# Patient Record
Sex: FEMALE | Race: WHITE | HISPANIC OR LATINO | Employment: FULL TIME | URBAN - METROPOLITAN AREA
[De-identification: names, ages, dates, MRNs, and addresses within clinical notes are randomized per-mention and may not be internally consistent; named-entity substitution may affect disease eponyms.]

---

## 2024-05-12 ENCOUNTER — APPOINTMENT (OUTPATIENT)
Dept: RADIOLOGY | Facility: MEDICAL CENTER | Age: 29
End: 2024-05-12
Attending: EMERGENCY MEDICINE

## 2024-05-12 ENCOUNTER — PHARMACY VISIT (OUTPATIENT)
Dept: PHARMACY | Facility: MEDICAL CENTER | Age: 29
End: 2024-05-12
Payer: COMMERCIAL

## 2024-05-12 ENCOUNTER — HOSPITAL ENCOUNTER (EMERGENCY)
Facility: MEDICAL CENTER | Age: 29
End: 2024-05-12
Attending: EMERGENCY MEDICINE

## 2024-05-12 VITALS
TEMPERATURE: 97 F | RESPIRATION RATE: 17 BRPM | HEART RATE: 56 BPM | SYSTOLIC BLOOD PRESSURE: 119 MMHG | DIASTOLIC BLOOD PRESSURE: 75 MMHG | HEIGHT: 65 IN | OXYGEN SATURATION: 98 % | BODY MASS INDEX: 27.25 KG/M2 | WEIGHT: 163.58 LBS

## 2024-05-12 DIAGNOSIS — L03.031 ABSCESS OR CELLULITIS OF TOE, RIGHT: ICD-10-CM

## 2024-05-12 DIAGNOSIS — L02.611 ABSCESS OR CELLULITIS OF TOE, RIGHT: ICD-10-CM

## 2024-05-12 LAB
GRAM STN SPEC: NORMAL
SIGNIFICANT IND 70042: NORMAL
SITE SITE: NORMAL
SOURCE SOURCE: NORMAL

## 2024-05-12 PROCEDURE — RXMED WILLOW AMBULATORY MEDICATION CHARGE: Performed by: EMERGENCY MEDICINE

## 2024-05-12 RX ORDER — AMOXICILLIN AND CLAVULANATE POTASSIUM 875; 125 MG/1; MG/1
1 TABLET, FILM COATED ORAL 2 TIMES DAILY
Qty: 14 TABLET | Refills: 0 | Status: ACTIVE | OUTPATIENT
Start: 2024-05-12 | End: 2024-05-19

## 2024-05-12 RX ORDER — SULFAMETHOXAZOLE AND TRIMETHOPRIM 800; 160 MG/1; MG/1
1 TABLET ORAL 2 TIMES DAILY
Qty: 14 TABLET | Refills: 0 | Status: ACTIVE | OUTPATIENT
Start: 2024-05-12 | End: 2024-05-19 | Stop reason: SDUPTHER

## 2024-05-12 NOTE — ED PROVIDER NOTES
"CHIEF COMPLAINT  Chief Complaint   Patient presents with    Leg Pain     RLE, started as R big toe pain 5 days ago and now radiates up RLE, small wound inside R big toe starting today, denies trauma, mild inflammation noted to toe       LIMITATION TO HISTORY   Language  used    HPI    Katelynn Montanez is a 28 y.o. female who presents to the Emergency Department for right great toe pain onset 5 days. She adds that she noticed a small wound at the toe that \"bursted\" this morning. She also reports experiencing right great toe swelling and itchiness. She denies fever or vomiting. No alleviating or exacerbating factors noted. Patient has no known allergies. She denies any past medical history.    OUTSIDE HISTORIAN(S):  Select: None    EXTERNAL RECORDS REVIEWED  Select: No records within EMR    PAST MEDICAL HISTORY  History reviewed. No pertinent past medical history.  .    SURGICAL HISTORY  No past surgical history noted.      FAMILY HISTORY  No family history noted.      SOCIAL HISTORY         CURRENT MEDICATIONS  Current Outpatient Medications   Medication Instructions    amoxicillin-clavulanate (AUGMENTIN) 875-125 MG Tab 1 Tablet, Oral, 2 TIMES DAILY    sulfamethoxazole-trimethoprim (BACTRIM DS) 800-160 MG tablet 1 Tablet, Oral, 2 TIMES DAILY     ALLERGIES  No Known Allergies    PHYSICAL EXAM  VITAL SIGNS:/80   Pulse 62   Temp 36.1 °C (96.9 °F) (Temporal)   Resp 16   Ht 1.651 m (5' 5\")   Wt 74.2 kg (163 lb 9.3 oz)   SpO2 99%   BMI 27.22 kg/m²       Constitutional: Well-developed no acute distress   HENT: Normocephalic, Atraumatic, Bilateral external ears normal.  Eyes:  conjunctiva are normal.   Neck: Supple.  Nontender midline  Cardiovascular: Regular rate and rhythm without murmurs gallops or rubs.   Thorax & Lungs: No respiratory distress. Breathing comfortably. Lungs are clear to auscultation bilaterally, there are no wheezes no rales. Chest wall is nontender.  Abdomen: Soft, " non distended, non tender   Skin: Warm, Dry,   Back: No tenderness, No CVA tenderness.  Musculoskeletal: No clubbing cyanosis or edema good range of motion, draining abscess at the lateral base of the right great toe with surrounding erythema  Neurologic: Alert & oriented x 3, normal sensation moving all extremities appears normal   Psychiatric: Affect normal, Judgment normal, Mood normal.     DIAGNOSTIC STUDIES    RADIOLOGY  I have independently interpreted the diagnostic imaging associated with this visit and am waiting the final reading from the radiologist.   My preliminary interpretation is as follows: No signs of osteomyelitis or abnormalities to the bone.    Radiologist interpretation:   DX-FOOT-2- RIGHT   Final Result      No radiographic evidence for osteomyelitis and there is no soft tissue gas        COURSE & MEDICAL DECISION MAKING    ED COURSE:    ED Observation Status? No, The patient does not qualify for observation status     1:09 PM - Patient seen and examined at bedside. I discussed that the patient's abscess likely appeared due to the patient using shoes that are too tight for her toes. I also discussed care at home for the abscess including salt water washes to clean the area and prevent further infection and starting an antibiotic course. She will also need to keep the toes . Ordered DX-foot right to evaluate. Patient verbalizes understanding and agreement to this plan of care.     2:13 PM - Patient was reevaluated at bedside. Discussed radiology results with the patient and informed them there were no signs of osteomyelitis. Patient had the opportunity to ask any questions. The plan for discharge was discussed with them and they were told to return for any new or worsening symptoms. She was also informed of the plans for follow up. Patient is understanding and agreeable to the plan for discharge.    INITIAL ASSESSMENT, COURSE AND PLAN  Care Narrative: Patient presents for evaluation.   It does appear the patient has an abrasive wound to the right great toe lateral side.  Most likely it is from wearing closed toed and cramped shoes.  I explained to the patient about wound bandage and draining recommended washing the wound out and soaking.  I will start the patient on antibiotics due to the suppurative nature MRSA cannot be completely ruled out so we will place on Augmentin as well as Bactrim.  Patient is to follow-up with occupational health since I do feel this could very well be related to occupational causes because of her job.  The patient should return as needed.     ADDITIONAL PROBLEM LIST  None    DISPOSITION AND DISCUSSIONS  I have discussed management of the patient with the following physicians/ NIKKI's/ ancillary staff:  None    Escalation of care considered, and ultimately not performed: None    Barriers to care at this time, including but not limited to:  None .     Decision tools and prescription drugs considered including, but not limited to: None.    The patient will return for new or worsening symptoms and is stable at the time of discharge.    The patient is referred to a primary physician for blood pressure management, diabetic screening, and for all other preventative health concerns.    DISPOSITION:  Patient will be discharged home in stable condition.    FOLLOW UP:  [address] [address] [phone]  In 1 week  For re-check, Return if any symptoms worsen      OUTPATIENT MEDICATIONS:  Discharge Medication List as of 5/12/2024  3:07 PM        START taking these medications    Details   amoxicillin-clavulanate (AUGMENTIN) 875-125 MG Tab Take 1 Tablet by mouth 2 times a day for 7 days., Disp-14 Tablet, R-0, Normal      sulfamethoxazole-trimethoprim (BACTRIM DS) 800-160 MG tablet Take 1 Tablet by mouth 2 times a day for 7 days., Disp-14 Tablet, R-0, Normal           FINAL DIAGNOSIS  1. Abscess or cellulitis of toe, right      I, Crow Haro),  am scribing for, and in the presence of, Iván Nicole M.D..    Electronically signed by: Crow Max (Scribe), 5/12/2024    IIván M.D. personally performed the services described in this documentation, as scribed by Crow Max in my presence, and it is both accurate and complete.     Electronically signed by: Iván Nicole M.D.,5:20 PM 05/12/24

## 2024-05-12 NOTE — LETTER
5/16/2024               Katelynn Montanez  [address] [facility] [address]        Dear Katelynn (MR#[id number])    This letter is sent in regards to your recent visit to the [facility] Emergency Department on 5/12/2024. During the visit, tests were performed to assist the physician in your medical diagnosis. A review of your tests requires that we notify you of the following:    Your wound culture was POSITIVE for a bacteria called Methicillin Resistant Staphylococcus aureus (MRSA). The antibiotic prescribed for you (sulfamethoxazole-trimethoprim and amoxicillin-clavulanate) should be active to treat this bacteria. It is important that you continue taking your antibiotic until it is finished.     Please feel free to contact me at the number below if you have any questions or concerns. Thank you for your cooperation in the matter.    Sincerely,  ED Culture Follow-Up Staff  Dwight Bush, PharmD    [facility], Emergency Department  [address] [place] [id number] [phone] (ED Culture Line)

## 2024-05-12 NOTE — ED NOTES
Pt foot soaked with water and hydrogen peroxide with much success.   Gauze applied between the toes to separate them. Pt resting in gurney with call light within reach.

## 2024-05-12 NOTE — ED NOTES
Pt provided discharge instructions, and prescription w/ the assistance of  ipad. Pt verbalized understanding of all instructions. Pt ambulatory to lobby w/ steady gait.

## 2024-05-12 NOTE — ED TRIAGE NOTES
"Chief Complaint   Patient presents with    Leg Pain     RLE, started as R big toe pain 5 days ago and now radiates up RLE, small wound inside R big toe starting today, denies trauma, mild inflammation noted to toe     Pt ambulatory to triage for above complaints, VSS on RA, GCS 15, NAD.     Pt returned to lobby. Educated on triage process and to inform staff of any changes.     /80   Pulse 62   Temp 36.1 °C (96.9 °F) (Temporal)   Resp 16   Ht 1.651 m (5' 5\")   Wt 74.2 kg (163 lb 9.3 oz)   SpO2 99%   BMI 27.22 kg/m²     "

## 2024-05-12 NOTE — ED NOTES
Pt ambulated to Y61 from lobby w/ steady gait. On monitor, call light in reach. Chart up for ERP.

## 2024-05-14 LAB
BACTERIA WND AEROBE CULT: ABNORMAL
BACTERIA WND AEROBE CULT: ABNORMAL
GRAM STN SPEC: ABNORMAL
SIGNIFICANT IND 70042: ABNORMAL
SITE SITE: ABNORMAL
SOURCE SOURCE: ABNORMAL

## 2024-05-16 NOTE — ED NOTES
ED Positive Culture Follow-up/Notification Note:    Date: 5/16/2024   Patient seen in the ED on 5/12/2024 for R great toe pain. Patient reports wound on toe that bursted prior to presentation along with swelling and itchiness. Patient denies any fever or vomiting.    Physical exam notable for a draining abscess at the base of the R great toe with surrounding erythema. In the ED patient was afebrile with stable vitals. Imaging showed no evidence of OM.   1. Abscess or cellulitis of toe, right       Discharge Medication List as of 5/12/2024  3:07 PM        START taking these medications    Details   amoxicillin-clavulanate (AUGMENTIN) 875-125 MG Tab Take 1 Tablet by mouth 2 times a day for 7 days., Disp-14 Tablet, R-0, Normal      sulfamethoxazole-trimethoprim (BACTRIM DS) 800-160 MG tablet Take 1 Tablet by mouth 2 times a day for 7 days., Disp-14 Tablet, R-0, Normal           Allergies: Patient has no known allergies.     Vitals:    05/12/24 1303 05/12/24 1404 05/12/24 1457 05/12/24 1503   BP: 119/70 111/77  119/75   Pulse: (!) 58 (!) 52  (!) 56   Resp: 18 17     Temp:   36.1 °C (97 °F)    TempSrc:   Temporal    SpO2: 98% 98%  98%   Weight:       Height:         Final cultures:   Results       Procedure Component Value Units Date/Time    CULTURE WOUND W/ GRAM STAIN [[id number]]  (Abnormal)  (Susceptibility) Collected: 05/12/24 1455    Order Status: Completed Specimen: Wound from Abscess Updated: 05/14/24 1121     Significant Indicator POS     Source WND     Site L foot     Culture Result -     Gram Stain Result Few WBCs.  Moderate Gram positive cocci.       Culture Result Methicillin Resistant Staphylococcus aureus  Heavy growth      Susceptibility       Methicillin resistant staphylococcus aureus (1)       Antibiotic Interpretation Microscan   Method Status    Ampicillin  [*]  Resistant >8 mcg/mL MARVIN Final    Amoxicillin/CA  [*]  Resistant <=4/2 mcg/mL MARVIN Final    Azithromycin  [*]  Sensitive <=2 mcg/mL MARVIN Final     Ceftriaxone  [*]  Resistant 8 mcg/mL MARVIN Final    Clindamycin Sensitive <=0.25 mcg/mL MARVIN Final    Cephalothin  [*]  Resistant <=8 mcg/mL MARVIN Final    Cefoxitin Screen  [*]  Positive >4 mcg/mL MARVIN Final    Cefazolin Resistant <=8 mcg/mL MARVIN Final    Ciprofloxacin  [*]  Intermediate 2 mcg/mL MARVIN Final    Cefepime Resistant 8 mcg/mL MARVIN Final    Ceftaroline  [*]  Sensitive <=0.5 mcg/mL MARVIN Final    Daptomycin Sensitive 1 mcg/mL MARVIN Final    Ampicillin/sulbactam Resistant <=8/4 mcg/mL MARVIN Final    Erythromycin Sensitive <=0.25 mcg/mL MARVIN Final    Vancomycin Sensitive 1 mcg/mL MARVIN Final    Gentamicin  [*]  Sensitive <=4 mcg/mL MARVIN Final    Imipenem  [*]  Resistant <=4 mcg/mL MARVIN Final    Levofloxacin  [*]  Sensitive <=1 mcg/mL MARVIN Final    Linezolid Sensitive 2 mcg/mL MARVIN Final    Meropenem  [*]  Resistant <=2 mcg/mL MARVIN Final    Oxacillin Resistant >2 mcg/mL MARVIN Final    Rifampin  [*]  Sensitive <=1 mcg/mL MARVIN Final    Synercid  [*]  Sensitive <=0.5 mcg/mL MARVIN Final    Trimeth/Sulfa Sensitive <=0.5/9.5 mcg/mL MARVIN Final    Tetracycline Sensitive <=4 mcg/mL MARVIN Final    Tigecycline  [*]  Sensitive <=0.25 mcg/mL MARVIN Final               [*]  Suppressed Antibiotic                   GRAM STAIN [[id number]] Collected: 05/12/24 1455    Order Status: Completed Specimen: Wound Updated: 05/12/24 2011     Significant Indicator .     Source WND     Site L foot     Gram Stain Result Few WBCs.  Moderate Gram positive cocci.            Plan:   Patient's wound culture returned positive for MRSA sensitive to prescribed TMP-SMX and resistant to prescribed amoxicillin-clavulanate. With wound culture and presentation consistent with MRSA infection amoxicillin-clavulanate can likely be stopped at this time.  Attempted to contact patient using  service to discuss results. Was unable to contact patient at listed number, unable to leave voicemail.  Sent letter to patient to notify of positive culture result and encourage compliance  with prescribed antibiotics.   Dwight Bush, PharmD

## 2024-05-19 ENCOUNTER — HOSPITAL ENCOUNTER (EMERGENCY)
Facility: MEDICAL CENTER | Age: 29
End: 2024-05-19
Attending: EMERGENCY MEDICINE

## 2024-05-19 ENCOUNTER — PHARMACY VISIT (OUTPATIENT)
Dept: PHARMACY | Facility: MEDICAL CENTER | Age: 29
End: 2024-05-19
Payer: COMMERCIAL

## 2024-05-19 VITALS
DIASTOLIC BLOOD PRESSURE: 77 MMHG | OXYGEN SATURATION: 98 % | TEMPERATURE: 98 F | RESPIRATION RATE: 15 BRPM | BODY MASS INDEX: 27.26 KG/M2 | SYSTOLIC BLOOD PRESSURE: 110 MMHG | WEIGHT: 163.8 LBS | HEART RATE: 67 BPM

## 2024-05-19 DIAGNOSIS — L08.9 TOE INFECTION: ICD-10-CM

## 2024-05-19 LAB — GLUCOSE BLD STRIP.AUTO-MCNC: 103 MG/DL (ref 65–99)

## 2024-05-19 PROCEDURE — RXMED WILLOW AMBULATORY MEDICATION CHARGE: Performed by: EMERGENCY MEDICINE

## 2024-05-19 RX ORDER — SULFAMETHOXAZOLE AND TRIMETHOPRIM 800; 160 MG/1; MG/1
1 TABLET ORAL 2 TIMES DAILY
Qty: 10 TABLET | Refills: 0 | Status: ACTIVE | OUTPATIENT
Start: 2024-05-19 | End: 2024-05-24

## 2024-05-19 NOTE — ED NOTES
Patient ambulated to room from lobby with steady gait. Agree with traige assesment.     Chart up for ERP to see.

## 2024-05-19 NOTE — ED PROVIDER NOTES
ED Provider Note    CHIEF COMPLAINT  Chief Complaint   Patient presents with    Wound Re-Check    Other     Work note       EXTERNAL RECORDS REVIEWED  Patient was last seen 5/12/2024 for a cellulitis of the great toe.  Discharged on Augmentin and Bactrim.  X-ray without evidence of osteomyelitis.  Wound culture returned positive for MRSA.    HPI/ROS  LIMITATION TO HISTORY   Select: : None  OUTSIDE HISTORIAN(S):  none    Katelynn Montanez is a 28 y.o. female who presents to the Emergency Department for wound check.  Patient was seen here 1 week ago for a wound to the right great toe.  She was discharged on antibiotics which she states she finished.  She reports that there was some bruising and blistering following her visit here however eventually it is now improved.  She reports resolution of the pain.  She still has a small wound with minimal drainage.  No fevers.  No history of diabetes.    PAST MEDICAL HISTORY  No past medical history on file.     SURGICAL HISTORY  No past surgical history on file.     FAMILY HISTORY  No family history on file.    SOCIAL HISTORY       CURRENT MEDICATIONS  Discharge Medication List as of 5/19/2024  5:29 PM        CONTINUE these medications which have NOT CHANGED    Details   amoxicillin-clavulanate (AUGMENTIN) 875-125 MG Tab Take 1 Tablet by mouth 2 times a day for 7 days., Disp-14 Tablet, R-0, Normal             ALLERGIES  Patient has no known allergies.    PHYSICAL EXAM  /77   Pulse 67   Temp 36.7 °C (98 °F)   Resp 15   Wt 74.3 kg (163 lb 12.8 oz)   SpO2 98%      Constitutional: Nontoxic appearing. Alert in no apparent distress.  HENT: Normocephalic, Atraumatic.  Moist mucous membranes.    Neck: Supple, full range of motion  Musculoskeletal: Atraumatic. No obvious deformities noted.  Small open wound to the lateral base of the right great toe with scant drainage.  Minimal surrounding erythema.  Good range of motion of the toe without pain.  Skin: Warm, Dry.  No  erythema, No rash.   Neurologic: Alert and oriented x3. Moving all extremities spontaneously without focal deficits.  Psychiatric: Affect normal, Mood normal, Appears appropriate and not intoxicated.      DIAGNOSTIC STUDIES / PROCEDURES      COURSE & MEDICAL DECISION MAKING      ASSESSMENT, COURSE AND PLAN  Care Narrative: Patient recently seen for great toe infection presenting for wound recheck.  She reports significant improvement of her symptoms however is still having some ongoing mild drainage.  She is afebrile with normal vital signs on arrival.  Appears does still have some ongoing infection.  I did consider further labs and imaging however improved from her comparison pictures.  Will discharge with a few more days of Bactrim based on wound culture sensitivities.  Patient is requesting clearance to go back to work as well.      ADDITIONAL PROBLEM LIST  Problem #1: Right great toe infection -appears improving although still having some drainage therefore will discharge with another 5 days of Bactrim      DISPOSITION AND DISCUSSIONS  Escalation of care considered, and ultimately not performed:Laboratory analysis and diagnostic imaging    Barriers to care at this time, including but not limited to: Patient does not have established PCP.     Decision tools and prescription drugs considered including, but not limited to: Pain Medications OTC medication to be sufficient .        DISPOSITION:  Patient will be discharged home in stable condition.    FOLLOW UP:  [facility], Emergency Dept  [address] [facility] [address] [phone]    If symptoms worsen    [place] [address] [place] [id number]  [phone]  Call   to establish primary care physician      OUTPATIENT MEDICATIONS:  Discharge Medication List as of 5/19/2024  5:29 PM        START taking these medications    Details   sulfamethoxazole-trimethoprim (BACTRIM DS) 800-160 MG tablet Take 1 Tablet by mouth 2  times a day for 5 days., Disp-10 Tablet, R-0, Normal               FINAL DIAGNOSIS  1. Toe infection

## 2024-05-19 NOTE — ED TRIAGE NOTES
Ambulatory to triage, triaged using the  iPad.  Patient was seen here on 5/12 for cellulitis of R great toe, taking antibiotics as prescribed.  Patient states she wasn't able to return to work at all last week so is here today for a recheck and note to be able to return to work.

## 2024-05-20 NOTE — DISCHARGE INSTRUCTIONS
You were seen in the Emergency Department for wound check.    Please use 1,000mg of tylenol or 600mg of ibuprofen every 6 hours as needed for pain.  Continue an extra 5 days of antibiotics.    If this is a Workmen's Comp. claim, he will need to follow-up at the occupational health clinic above.    Return to the Emergency Department with increasing pain or swelling, fevers, or other concerns.

## 2024-08-13 ENCOUNTER — HOSPITAL ENCOUNTER (OUTPATIENT)
Dept: RADIOLOGY | Facility: MEDICAL CENTER | Age: 29
End: 2024-08-13

## 2024-08-13 ENCOUNTER — HOSPITAL ENCOUNTER (INPATIENT)
Facility: MEDICAL CENTER | Age: 29
LOS: 5 days | DRG: 144 | End: 2024-08-18
Attending: STUDENT IN AN ORGANIZED HEALTH CARE EDUCATION/TRAINING PROGRAM | Admitting: INTERNAL MEDICINE
Payer: MEDICAID

## 2024-08-13 DIAGNOSIS — G08 CAVERNOUS SINUS THROMBOSIS: ICD-10-CM

## 2024-08-13 DIAGNOSIS — J01.40 ACUTE PANSINUSITIS, RECURRENCE NOT SPECIFIED: ICD-10-CM

## 2024-08-13 DIAGNOSIS — L03.213 PERIORBITAL CELLULITIS OF RIGHT EYE: ICD-10-CM

## 2024-08-13 DIAGNOSIS — J01.00 ACUTE NON-RECURRENT MAXILLARY SINUSITIS: ICD-10-CM

## 2024-08-13 LAB
ALBUMIN SERPL BCP-MCNC: 3.9 G/DL (ref 3.2–4.9)
ALBUMIN/GLOB SERPL: 1.3 G/DL
ALP SERPL-CCNC: 71 U/L (ref 30–99)
ALT SERPL-CCNC: 14 U/L (ref 2–50)
ANION GAP SERPL CALC-SCNC: 12 MMOL/L (ref 7–16)
AST SERPL-CCNC: 13 U/L (ref 12–45)
BASOPHILS # BLD AUTO: 0.5 % (ref 0–1.8)
BASOPHILS # BLD: 0.04 K/UL (ref 0–0.12)
BILIRUB SERPL-MCNC: <0.2 MG/DL (ref 0.1–1.5)
BUN SERPL-MCNC: 10 MG/DL (ref 8–22)
CALCIUM ALBUM COR SERPL-MCNC: 8.9 MG/DL (ref 8.5–10.5)
CALCIUM SERPL-MCNC: 8.8 MG/DL (ref 8.5–10.5)
CHLORIDE SERPL-SCNC: 104 MMOL/L (ref 96–112)
CO2 SERPL-SCNC: 20 MMOL/L (ref 20–33)
CREAT SERPL-MCNC: 0.52 MG/DL (ref 0.5–1.4)
EOSINOPHIL # BLD AUTO: 0.36 K/UL (ref 0–0.51)
EOSINOPHIL NFR BLD: 4.7 % (ref 0–6.9)
ERYTHROCYTE [DISTWIDTH] IN BLOOD BY AUTOMATED COUNT: 41.4 FL (ref 35.9–50)
GFR SERPLBLD CREATININE-BSD FMLA CKD-EPI: 129 ML/MIN/1.73 M 2
GLOBULIN SER CALC-MCNC: 3 G/DL (ref 1.9–3.5)
GLUCOSE SERPL-MCNC: 108 MG/DL (ref 65–99)
HCT VFR BLD AUTO: 38.1 % (ref 37–47)
HGB BLD-MCNC: 12.6 G/DL (ref 12–16)
IMM GRANULOCYTES # BLD AUTO: 0.01 K/UL (ref 0–0.11)
IMM GRANULOCYTES NFR BLD AUTO: 0.1 % (ref 0–0.9)
LYMPHOCYTES # BLD AUTO: 3.19 K/UL (ref 1–4.8)
LYMPHOCYTES NFR BLD: 41.6 % (ref 22–41)
MCH RBC QN AUTO: 29.2 PG (ref 27–33)
MCHC RBC AUTO-ENTMCNC: 33.1 G/DL (ref 32.2–35.5)
MCV RBC AUTO: 88.2 FL (ref 81.4–97.8)
MONOCYTES # BLD AUTO: 0.68 K/UL (ref 0–0.85)
MONOCYTES NFR BLD AUTO: 8.9 % (ref 0–13.4)
NEUTROPHILS # BLD AUTO: 3.39 K/UL (ref 1.82–7.42)
NEUTROPHILS NFR BLD: 44.2 % (ref 44–72)
NRBC # BLD AUTO: 0 K/UL
NRBC BLD-RTO: 0 /100 WBC (ref 0–0.2)
PLATELET # BLD AUTO: 358 K/UL (ref 164–446)
PMV BLD AUTO: 8.8 FL (ref 9–12.9)
POTASSIUM SERPL-SCNC: 4.1 MMOL/L (ref 3.6–5.5)
PROT SERPL-MCNC: 6.9 G/DL (ref 6–8.2)
RBC # BLD AUTO: 4.32 M/UL (ref 4.2–5.4)
SODIUM SERPL-SCNC: 136 MMOL/L (ref 135–145)
WBC # BLD AUTO: 7.7 K/UL (ref 4.8–10.8)

## 2024-08-13 PROCEDURE — 99291 CRITICAL CARE FIRST HOUR: CPT | Mod: GC | Performed by: INTERNAL MEDICINE

## 2024-08-13 PROCEDURE — 85025 COMPLETE CBC W/AUTO DIFF WBC: CPT

## 2024-08-13 PROCEDURE — 80053 COMPREHEN METABOLIC PANEL: CPT

## 2024-08-13 PROCEDURE — 96365 THER/PROPH/DIAG IV INF INIT: CPT

## 2024-08-13 PROCEDURE — 770022 HCHG ROOM/CARE - ICU (200)

## 2024-08-13 PROCEDURE — 99291 CRITICAL CARE FIRST HOUR: CPT

## 2024-08-13 PROCEDURE — 36415 COLL VENOUS BLD VENIPUNCTURE: CPT

## 2024-08-13 PROCEDURE — 84703 CHORIONIC GONADOTROPIN ASSAY: CPT

## 2024-08-13 PROCEDURE — 700111 HCHG RX REV CODE 636 W/ 250 OVERRIDE (IP): Performed by: STUDENT IN AN ORGANIZED HEALTH CARE EDUCATION/TRAINING PROGRAM

## 2024-08-13 PROCEDURE — 700111 HCHG RX REV CODE 636 W/ 250 OVERRIDE (IP): Mod: UD | Performed by: INTERNAL MEDICINE

## 2024-08-13 RX ORDER — PROMETHAZINE HYDROCHLORIDE 25 MG/1
12.5-25 SUPPOSITORY RECTAL EVERY 4 HOURS PRN
Status: DISCONTINUED | OUTPATIENT
Start: 2024-08-13 | End: 2024-08-18 | Stop reason: HOSPADM

## 2024-08-13 RX ORDER — SODIUM CHLORIDE 9 MG/ML
INJECTION, SOLUTION INTRAVENOUS CONTINUOUS
Status: DISCONTINUED | OUTPATIENT
Start: 2024-08-13 | End: 2024-08-14

## 2024-08-13 RX ORDER — POLYETHYLENE GLYCOL 3350 17 G/17G
1 POWDER, FOR SOLUTION ORAL
Status: DISCONTINUED | OUTPATIENT
Start: 2024-08-13 | End: 2024-08-18 | Stop reason: HOSPADM

## 2024-08-13 RX ORDER — ONDANSETRON 4 MG/1
4 TABLET, ORALLY DISINTEGRATING ORAL EVERY 4 HOURS PRN
Status: DISCONTINUED | OUTPATIENT
Start: 2024-08-13 | End: 2024-08-18 | Stop reason: HOSPADM

## 2024-08-13 RX ORDER — PROMETHAZINE HYDROCHLORIDE 25 MG/1
12.5-25 TABLET ORAL EVERY 4 HOURS PRN
Status: DISCONTINUED | OUTPATIENT
Start: 2024-08-13 | End: 2024-08-18 | Stop reason: HOSPADM

## 2024-08-13 RX ORDER — HEPARIN SODIUM 5000 [USP'U]/100ML
0-30 INJECTION, SOLUTION INTRAVENOUS CONTINUOUS
Status: DISCONTINUED | OUTPATIENT
Start: 2024-08-13 | End: 2024-08-14

## 2024-08-13 RX ORDER — ONDANSETRON 2 MG/ML
4 INJECTION INTRAMUSCULAR; INTRAVENOUS EVERY 4 HOURS PRN
Status: DISCONTINUED | OUTPATIENT
Start: 2024-08-13 | End: 2024-08-18 | Stop reason: HOSPADM

## 2024-08-13 RX ORDER — HYDROMORPHONE HYDROCHLORIDE 1 MG/ML
0.5 INJECTION, SOLUTION INTRAMUSCULAR; INTRAVENOUS; SUBCUTANEOUS
Status: DISCONTINUED | OUTPATIENT
Start: 2024-08-13 | End: 2024-08-18 | Stop reason: HOSPADM

## 2024-08-13 RX ORDER — ACETAMINOPHEN 325 MG/1
650 TABLET ORAL EVERY 6 HOURS PRN
Status: DISCONTINUED | OUTPATIENT
Start: 2024-08-13 | End: 2024-08-18 | Stop reason: HOSPADM

## 2024-08-13 RX ORDER — OXYCODONE HYDROCHLORIDE 5 MG/1
2.5-5 TABLET ORAL EVERY 4 HOURS PRN
Status: DISCONTINUED | OUTPATIENT
Start: 2024-08-13 | End: 2024-08-18 | Stop reason: HOSPADM

## 2024-08-13 RX ORDER — HYDRALAZINE HYDROCHLORIDE 20 MG/ML
10 INJECTION INTRAMUSCULAR; INTRAVENOUS EVERY 4 HOURS PRN
Status: DISCONTINUED | OUTPATIENT
Start: 2024-08-13 | End: 2024-08-18 | Stop reason: HOSPADM

## 2024-08-13 RX ORDER — HEPARIN SODIUM 1000 [USP'U]/ML
40 INJECTION, SOLUTION INTRAVENOUS; SUBCUTANEOUS PRN
Status: DISCONTINUED | OUTPATIENT
Start: 2024-08-14 | End: 2024-08-14

## 2024-08-13 RX ORDER — PROCHLORPERAZINE EDISYLATE 5 MG/ML
5-10 INJECTION INTRAMUSCULAR; INTRAVENOUS EVERY 4 HOURS PRN
Status: DISCONTINUED | OUTPATIENT
Start: 2024-08-13 | End: 2024-08-18 | Stop reason: HOSPADM

## 2024-08-13 RX ORDER — AMOXICILLIN 250 MG
2 CAPSULE ORAL 2 TIMES DAILY
Status: DISCONTINUED | OUTPATIENT
Start: 2024-08-13 | End: 2024-08-18 | Stop reason: HOSPADM

## 2024-08-13 RX ADMIN — HEPARIN SODIUM 18 UNITS/KG/HR: 5000 INJECTION, SOLUTION INTRAVENOUS at 22:48

## 2024-08-13 ASSESSMENT — PAIN DESCRIPTION - PAIN TYPE: TYPE: ACUTE PAIN

## 2024-08-14 ENCOUNTER — ANESTHESIA EVENT (OUTPATIENT)
Dept: SURGERY | Facility: MEDICAL CENTER | Age: 29
DRG: 144 | End: 2024-08-14
Payer: MEDICAID

## 2024-08-14 ENCOUNTER — APPOINTMENT (OUTPATIENT)
Dept: RADIOLOGY | Facility: MEDICAL CENTER | Age: 29
DRG: 144 | End: 2024-08-14
Attending: STUDENT IN AN ORGANIZED HEALTH CARE EDUCATION/TRAINING PROGRAM
Payer: MEDICAID

## 2024-08-14 ENCOUNTER — ANESTHESIA (OUTPATIENT)
Dept: SURGERY | Facility: MEDICAL CENTER | Age: 29
DRG: 144 | End: 2024-08-14
Payer: MEDICAID

## 2024-08-14 ENCOUNTER — APPOINTMENT (OUTPATIENT)
Dept: RADIOLOGY | Facility: MEDICAL CENTER | Age: 29
DRG: 144 | End: 2024-08-14
Attending: OTOLARYNGOLOGY
Payer: MEDICAID

## 2024-08-14 PROBLEM — L03.213 PERIORBITAL CELLULITIS OF RIGHT EYE: Status: ACTIVE | Noted: 2024-08-14

## 2024-08-14 PROBLEM — J01.90 ACUTE SINUSITIS: Status: ACTIVE | Noted: 2024-08-14

## 2024-08-14 LAB
ALBUMIN SERPL BCP-MCNC: 3.5 G/DL (ref 3.2–4.9)
ALBUMIN/GLOB SERPL: 1.3 G/DL
ALP SERPL-CCNC: 62 U/L (ref 30–99)
ALT SERPL-CCNC: 10 U/L (ref 2–50)
ANION GAP SERPL CALC-SCNC: 12 MMOL/L (ref 7–16)
AST SERPL-CCNC: 10 U/L (ref 12–45)
BASOPHILS # BLD AUTO: 0.6 % (ref 0–1.8)
BASOPHILS # BLD: 0.04 K/UL (ref 0–0.12)
BILIRUB SERPL-MCNC: <0.2 MG/DL (ref 0.1–1.5)
BUN SERPL-MCNC: 8 MG/DL (ref 8–22)
CALCIUM ALBUM COR SERPL-MCNC: 8.7 MG/DL (ref 8.5–10.5)
CALCIUM SERPL-MCNC: 8.3 MG/DL (ref 8.5–10.5)
CHLORIDE SERPL-SCNC: 105 MMOL/L (ref 96–112)
CO2 SERPL-SCNC: 20 MMOL/L (ref 20–33)
CREAT SERPL-MCNC: 0.47 MG/DL (ref 0.5–1.4)
EOSINOPHIL # BLD AUTO: 0.31 K/UL (ref 0–0.51)
EOSINOPHIL NFR BLD: 4.8 % (ref 0–6.9)
ERYTHROCYTE [DISTWIDTH] IN BLOOD BY AUTOMATED COUNT: 41.5 FL (ref 35.9–50)
GFR SERPLBLD CREATININE-BSD FMLA CKD-EPI: 132 ML/MIN/1.73 M 2
GLOBULIN SER CALC-MCNC: 2.8 G/DL (ref 1.9–3.5)
GLUCOSE SERPL-MCNC: 93 MG/DL (ref 65–99)
HCG SERPL QL: NEGATIVE
HCT VFR BLD AUTO: 36.4 % (ref 37–47)
HGB BLD-MCNC: 11.9 G/DL (ref 12–16)
IMM GRANULOCYTES # BLD AUTO: 0.02 K/UL (ref 0–0.11)
IMM GRANULOCYTES NFR BLD AUTO: 0.3 % (ref 0–0.9)
INR PPP: 1.13 (ref 0.87–1.13)
LYMPHOCYTES # BLD AUTO: 2.84 K/UL (ref 1–4.8)
LYMPHOCYTES NFR BLD: 44.3 % (ref 22–41)
MAGNESIUM SERPL-MCNC: 1.8 MG/DL (ref 1.5–2.5)
MCH RBC QN AUTO: 29 PG (ref 27–33)
MCHC RBC AUTO-ENTMCNC: 32.7 G/DL (ref 32.2–35.5)
MCV RBC AUTO: 88.6 FL (ref 81.4–97.8)
MONOCYTES # BLD AUTO: 0.56 K/UL (ref 0–0.85)
MONOCYTES NFR BLD AUTO: 8.7 % (ref 0–13.4)
NEUTROPHILS # BLD AUTO: 2.64 K/UL (ref 1.82–7.42)
NEUTROPHILS NFR BLD: 41.3 % (ref 44–72)
NRBC # BLD AUTO: 0 K/UL
NRBC BLD-RTO: 0 /100 WBC (ref 0–0.2)
PHOSPHATE SERPL-MCNC: 3.3 MG/DL (ref 2.5–4.5)
PLATELET # BLD AUTO: 317 K/UL (ref 164–446)
PMV BLD AUTO: 8.7 FL (ref 9–12.9)
POTASSIUM SERPL-SCNC: 3.8 MMOL/L (ref 3.6–5.5)
PROT SERPL-MCNC: 6.3 G/DL (ref 6–8.2)
PROTHROMBIN TIME: 14.7 SEC (ref 12–14.6)
RBC # BLD AUTO: 4.11 M/UL (ref 4.2–5.4)
SCCMEC + MECA PNL NOSE NAA+PROBE: NEGATIVE
SODIUM SERPL-SCNC: 137 MMOL/L (ref 135–145)
UFH PPP CHRO-ACNC: 0.37 IU/ML
UFH PPP CHRO-ACNC: 0.74 IU/ML
WBC # BLD AUTO: 6.4 K/UL (ref 4.8–10.8)

## 2024-08-14 PROCEDURE — 87076 CULTURE ANAEROBE IDENT EACH: CPT | Mod: 91

## 2024-08-14 PROCEDURE — 700101 HCHG RX REV CODE 250: Performed by: OTOLARYNGOLOGY

## 2024-08-14 PROCEDURE — 87641 MR-STAPH DNA AMP PROBE: CPT

## 2024-08-14 PROCEDURE — 80053 COMPREHEN METABOLIC PANEL: CPT

## 2024-08-14 PROCEDURE — 87186 SC STD MICRODIL/AGAR DIL: CPT

## 2024-08-14 PROCEDURE — 160009 HCHG ANES TIME/MIN: Performed by: OTOLARYNGOLOGY

## 2024-08-14 PROCEDURE — A9270 NON-COVERED ITEM OR SERVICE: HCPCS | Performed by: INTERNAL MEDICINE

## 2024-08-14 PROCEDURE — 09BS8ZZ EXCISION OF RIGHT FRONTAL SINUS, VIA NATURAL OR ARTIFICIAL OPENING ENDOSCOPIC: ICD-10-PCS | Performed by: OTOLARYNGOLOGY

## 2024-08-14 PROCEDURE — 099R8ZZ DRAINAGE OF LEFT MAXILLARY SINUS, VIA NATURAL OR ARTIFICIAL OPENING ENDOSCOPIC: ICD-10-PCS | Performed by: OTOLARYNGOLOGY

## 2024-08-14 PROCEDURE — 87075 CULTR BACTERIA EXCEPT BLOOD: CPT

## 2024-08-14 PROCEDURE — 99291 CRITICAL CARE FIRST HOUR: CPT | Performed by: STUDENT IN AN ORGANIZED HEALTH CARE EDUCATION/TRAINING PROGRAM

## 2024-08-14 PROCEDURE — 8E09XBZ COMPUTER ASSISTED PROCEDURE OF HEAD AND NECK REGION: ICD-10-PCS | Performed by: OTOLARYNGOLOGY

## 2024-08-14 PROCEDURE — 700101 HCHG RX REV CODE 250: Performed by: ANESTHESIOLOGY

## 2024-08-14 PROCEDURE — 700111 HCHG RX REV CODE 636 W/ 250 OVERRIDE (IP): Performed by: INTERNAL MEDICINE

## 2024-08-14 PROCEDURE — 700102 HCHG RX REV CODE 250 W/ 637 OVERRIDE(OP): Performed by: ANESTHESIOLOGY

## 2024-08-14 PROCEDURE — 84100 ASSAY OF PHOSPHORUS: CPT

## 2024-08-14 PROCEDURE — 099Q8ZZ DRAINAGE OF RIGHT MAXILLARY SINUS, VIA NATURAL OR ARTIFICIAL OPENING ENDOSCOPIC: ICD-10-PCS | Performed by: OTOLARYNGOLOGY

## 2024-08-14 PROCEDURE — 85025 COMPLETE CBC W/AUTO DIFF WBC: CPT

## 2024-08-14 PROCEDURE — 83735 ASSAY OF MAGNESIUM: CPT

## 2024-08-14 PROCEDURE — 700105 HCHG RX REV CODE 258: Performed by: ANESTHESIOLOGY

## 2024-08-14 PROCEDURE — 85520 HEPARIN ASSAY: CPT

## 2024-08-14 PROCEDURE — 160048 HCHG OR STATISTICAL LEVEL 1-5: Performed by: OTOLARYNGOLOGY

## 2024-08-14 PROCEDURE — A9579 GAD-BASE MR CONTRAST NOS,1ML: HCPCS | Mod: JZ | Performed by: STUDENT IN AN ORGANIZED HEALTH CARE EDUCATION/TRAINING PROGRAM

## 2024-08-14 PROCEDURE — 70450 CT HEAD/BRAIN W/O DYE: CPT

## 2024-08-14 PROCEDURE — 87077 CULTURE AEROBIC IDENTIFY: CPT | Mod: 91

## 2024-08-14 PROCEDURE — 700102 HCHG RX REV CODE 250 W/ 637 OVERRIDE(OP): Performed by: OTOLARYNGOLOGY

## 2024-08-14 PROCEDURE — 87205 SMEAR GRAM STAIN: CPT

## 2024-08-14 PROCEDURE — 09BT8ZZ EXCISION OF LEFT FRONTAL SINUS, VIA NATURAL OR ARTIFICIAL OPENING ENDOSCOPIC: ICD-10-PCS | Performed by: OTOLARYNGOLOGY

## 2024-08-14 PROCEDURE — 09BL8ZZ EXCISION OF NASAL TURBINATE, VIA NATURAL OR ARTIFICIAL OPENING ENDOSCOPIC: ICD-10-PCS | Performed by: OTOLARYNGOLOGY

## 2024-08-14 PROCEDURE — 700111 HCHG RX REV CODE 636 W/ 250 OVERRIDE (IP): Mod: JZ | Performed by: ANESTHESIOLOGY

## 2024-08-14 PROCEDURE — 87070 CULTURE OTHR SPECIMN AEROBIC: CPT

## 2024-08-14 PROCEDURE — 700101 HCHG RX REV CODE 250: Performed by: INTERNAL MEDICINE

## 2024-08-14 PROCEDURE — 70543 MRI ORBT/FAC/NCK W/O &W/DYE: CPT

## 2024-08-14 PROCEDURE — 70553 MRI BRAIN STEM W/O & W/DYE: CPT

## 2024-08-14 PROCEDURE — 99222 1ST HOSP IP/OBS MODERATE 55: CPT | Performed by: PSYCHIATRY & NEUROLOGY

## 2024-08-14 PROCEDURE — 700102 HCHG RX REV CODE 250 W/ 637 OVERRIDE(OP): Performed by: INTERNAL MEDICINE

## 2024-08-14 PROCEDURE — 160029 HCHG SURGERY MINUTES - 1ST 30 MINS LEVEL 4: Performed by: OTOLARYNGOLOGY

## 2024-08-14 PROCEDURE — 700111 HCHG RX REV CODE 636 W/ 250 OVERRIDE (IP): Performed by: ANESTHESIOLOGY

## 2024-08-14 PROCEDURE — 700117 HCHG RX CONTRAST REV CODE 255: Mod: JZ | Performed by: STUDENT IN AN ORGANIZED HEALTH CARE EDUCATION/TRAINING PROGRAM

## 2024-08-14 PROCEDURE — A9270 NON-COVERED ITEM OR SERVICE: HCPCS | Performed by: ANESTHESIOLOGY

## 2024-08-14 PROCEDURE — 88305 TISSUE EXAM BY PATHOLOGIST: CPT

## 2024-08-14 PROCEDURE — 85610 PROTHROMBIN TIME: CPT

## 2024-08-14 PROCEDURE — 700105 HCHG RX REV CODE 258: Performed by: INTERNAL MEDICINE

## 2024-08-14 PROCEDURE — 87040 BLOOD CULTURE FOR BACTERIA: CPT

## 2024-08-14 PROCEDURE — 160002 HCHG RECOVERY MINUTES (STAT): Performed by: OTOLARYNGOLOGY

## 2024-08-14 PROCEDURE — 88311 DECALCIFY TISSUE: CPT

## 2024-08-14 PROCEDURE — 770022 HCHG ROOM/CARE - ICU (200)

## 2024-08-14 PROCEDURE — 160041 HCHG SURGERY MINUTES - EA ADDL 1 MIN LEVEL 4: Performed by: OTOLARYNGOLOGY

## 2024-08-14 PROCEDURE — A9270 NON-COVERED ITEM OR SERVICE: HCPCS | Performed by: OTOLARYNGOLOGY

## 2024-08-14 PROCEDURE — 160035 HCHG PACU - 1ST 60 MINS PHASE I: Performed by: OTOLARYNGOLOGY

## 2024-08-14 PROCEDURE — 87102 FUNGUS ISOLATION CULTURE: CPT

## 2024-08-14 RX ORDER — HALOPERIDOL 5 MG/ML
1 INJECTION INTRAMUSCULAR
Status: DISCONTINUED | OUTPATIENT
Start: 2024-08-14 | End: 2024-08-14 | Stop reason: HOSPADM

## 2024-08-14 RX ORDER — OXYMETAZOLINE HYDROCHLORIDE 0.05 G/100ML
2 SPRAY NASAL
Status: COMPLETED | OUTPATIENT
Start: 2024-08-14 | End: 2024-08-15

## 2024-08-14 RX ORDER — TOBRAMYCIN AND DEXAMETHASONE 3; 1 MG/ML; MG/ML
1 SUSPENSION/ DROPS OPHTHALMIC 4 TIMES DAILY
Status: DISCONTINUED | OUTPATIENT
Start: 2024-08-14 | End: 2024-08-18 | Stop reason: HOSPADM

## 2024-08-14 RX ORDER — SODIUM CHLORIDE, SODIUM LACTATE, POTASSIUM CHLORIDE, CALCIUM CHLORIDE 600; 310; 30; 20 MG/100ML; MG/100ML; MG/100ML; MG/100ML
INJECTION, SOLUTION INTRAVENOUS CONTINUOUS
Status: DISCONTINUED | OUTPATIENT
Start: 2024-08-14 | End: 2024-08-14 | Stop reason: HOSPADM

## 2024-08-14 RX ORDER — CEFTRIAXONE 1 G/1
INJECTION, POWDER, FOR SOLUTION INTRAMUSCULAR; INTRAVENOUS PRN
Status: DISCONTINUED | OUTPATIENT
Start: 2024-08-14 | End: 2024-08-14 | Stop reason: SURG

## 2024-08-14 RX ORDER — ONDANSETRON 2 MG/ML
INJECTION INTRAMUSCULAR; INTRAVENOUS PRN
Status: DISCONTINUED | OUTPATIENT
Start: 2024-08-14 | End: 2024-08-14 | Stop reason: SURG

## 2024-08-14 RX ORDER — DIPHENHYDRAMINE HYDROCHLORIDE 50 MG/ML
12.5 INJECTION INTRAMUSCULAR; INTRAVENOUS
Status: DISCONTINUED | OUTPATIENT
Start: 2024-08-14 | End: 2024-08-14 | Stop reason: HOSPADM

## 2024-08-14 RX ORDER — HYDROMORPHONE HYDROCHLORIDE 1 MG/ML
0.2 INJECTION, SOLUTION INTRAMUSCULAR; INTRAVENOUS; SUBCUTANEOUS
Status: DISCONTINUED | OUTPATIENT
Start: 2024-08-14 | End: 2024-08-14 | Stop reason: HOSPADM

## 2024-08-14 RX ORDER — ECHINACEA PURPUREA EXTRACT 125 MG
2 TABLET ORAL 4 TIMES DAILY
Status: DISCONTINUED | OUTPATIENT
Start: 2024-08-15 | End: 2024-08-18 | Stop reason: HOSPADM

## 2024-08-14 RX ORDER — HYDROMORPHONE HYDROCHLORIDE 1 MG/ML
0.4 INJECTION, SOLUTION INTRAMUSCULAR; INTRAVENOUS; SUBCUTANEOUS
Status: DISCONTINUED | OUTPATIENT
Start: 2024-08-14 | End: 2024-08-14 | Stop reason: HOSPADM

## 2024-08-14 RX ORDER — METRONIDAZOLE 500 MG/100ML
500 INJECTION, SOLUTION INTRAVENOUS EVERY 12 HOURS
Status: DISCONTINUED | OUTPATIENT
Start: 2024-08-14 | End: 2024-08-15

## 2024-08-14 RX ORDER — ONDANSETRON 2 MG/ML
4 INJECTION INTRAMUSCULAR; INTRAVENOUS
Status: DISCONTINUED | OUTPATIENT
Start: 2024-08-14 | End: 2024-08-14 | Stop reason: HOSPADM

## 2024-08-14 RX ORDER — MEPERIDINE HYDROCHLORIDE 25 MG/ML
12.5 INJECTION INTRAMUSCULAR; INTRAVENOUS; SUBCUTANEOUS
Status: DISCONTINUED | OUTPATIENT
Start: 2024-08-14 | End: 2024-08-14 | Stop reason: HOSPADM

## 2024-08-14 RX ORDER — ROCURONIUM BROMIDE 10 MG/ML
INJECTION, SOLUTION INTRAVENOUS PRN
Status: DISCONTINUED | OUTPATIENT
Start: 2024-08-14 | End: 2024-08-14 | Stop reason: SURG

## 2024-08-14 RX ORDER — OXYCODONE HCL 5 MG/5 ML
5 SOLUTION, ORAL ORAL
Status: COMPLETED | OUTPATIENT
Start: 2024-08-14 | End: 2024-08-14

## 2024-08-14 RX ORDER — HYDROMORPHONE HYDROCHLORIDE 1 MG/ML
0.1 INJECTION, SOLUTION INTRAMUSCULAR; INTRAVENOUS; SUBCUTANEOUS
Status: DISCONTINUED | OUTPATIENT
Start: 2024-08-14 | End: 2024-08-14 | Stop reason: HOSPADM

## 2024-08-14 RX ORDER — LABETALOL HYDROCHLORIDE 5 MG/ML
5 INJECTION, SOLUTION INTRAVENOUS
Status: DISCONTINUED | OUTPATIENT
Start: 2024-08-14 | End: 2024-08-14 | Stop reason: HOSPADM

## 2024-08-14 RX ORDER — SODIUM CHLORIDE, SODIUM LACTATE, POTASSIUM CHLORIDE, CALCIUM CHLORIDE 600; 310; 30; 20 MG/100ML; MG/100ML; MG/100ML; MG/100ML
INJECTION, SOLUTION INTRAVENOUS
Status: DISCONTINUED | OUTPATIENT
Start: 2024-08-14 | End: 2024-08-14 | Stop reason: SURG

## 2024-08-14 RX ORDER — LIDOCAINE HYDROCHLORIDE 20 MG/ML
INJECTION, SOLUTION EPIDURAL; INFILTRATION; INTRACAUDAL; PERINEURAL PRN
Status: DISCONTINUED | OUTPATIENT
Start: 2024-08-14 | End: 2024-08-14 | Stop reason: SURG

## 2024-08-14 RX ORDER — EPHEDRINE SULFATE 50 MG/ML
5 INJECTION, SOLUTION INTRAVENOUS
Status: DISCONTINUED | OUTPATIENT
Start: 2024-08-14 | End: 2024-08-14 | Stop reason: HOSPADM

## 2024-08-14 RX ORDER — LIDOCAINE HYDROCHLORIDE AND EPINEPHRINE 10; 10 MG/ML; UG/ML
INJECTION, SOLUTION INFILTRATION; PERINEURAL
Status: DISCONTINUED | OUTPATIENT
Start: 2024-08-14 | End: 2024-08-14 | Stop reason: HOSPADM

## 2024-08-14 RX ORDER — IPRATROPIUM BROMIDE AND ALBUTEROL SULFATE 2.5; .5 MG/3ML; MG/3ML
3 SOLUTION RESPIRATORY (INHALATION)
Status: DISCONTINUED | OUTPATIENT
Start: 2024-08-14 | End: 2024-08-14 | Stop reason: HOSPADM

## 2024-08-14 RX ORDER — METRONIDAZOLE 500 MG/100ML
INJECTION, SOLUTION INTRAVENOUS PRN
Status: DISCONTINUED | OUTPATIENT
Start: 2024-08-14 | End: 2024-08-14 | Stop reason: SURG

## 2024-08-14 RX ORDER — OXYCODONE HCL 5 MG/5 ML
10 SOLUTION, ORAL ORAL
Status: COMPLETED | OUTPATIENT
Start: 2024-08-14 | End: 2024-08-14

## 2024-08-14 RX ORDER — HALOPERIDOL 5 MG/ML
INJECTION INTRAMUSCULAR PRN
Status: DISCONTINUED | OUTPATIENT
Start: 2024-08-14 | End: 2024-08-14 | Stop reason: SURG

## 2024-08-14 RX ORDER — HYDRALAZINE HYDROCHLORIDE 20 MG/ML
5 INJECTION INTRAMUSCULAR; INTRAVENOUS
Status: DISCONTINUED | OUTPATIENT
Start: 2024-08-14 | End: 2024-08-14 | Stop reason: HOSPADM

## 2024-08-14 RX ORDER — MIDAZOLAM HYDROCHLORIDE 1 MG/ML
1 INJECTION INTRAMUSCULAR; INTRAVENOUS
Status: DISCONTINUED | OUTPATIENT
Start: 2024-08-14 | End: 2024-08-14 | Stop reason: HOSPADM

## 2024-08-14 RX ADMIN — TOBRAMYCIN AND DEXAMETHASONE 1 DROP: 1; 3 SUSPENSION/ DROPS OPHTHALMIC at 23:26

## 2024-08-14 RX ADMIN — OXYCODONE HYDROCHLORIDE 5 MG: 5 TABLET ORAL at 00:30

## 2024-08-14 RX ADMIN — VANCOMYCIN HYDROCHLORIDE 1000 MG: 5 INJECTION, POWDER, LYOPHILIZED, FOR SOLUTION INTRAVENOUS at 05:07

## 2024-08-14 RX ADMIN — OXYMETAZOLINE HCL 2 SPRAY: 0.05 SPRAY NASAL at 23:26

## 2024-08-14 RX ADMIN — ONDANSETRON 4 MG: 2 INJECTION INTRAMUSCULAR; INTRAVENOUS at 17:12

## 2024-08-14 RX ADMIN — FENTANYL CITRATE 50 MCG: 50 INJECTION, SOLUTION INTRAMUSCULAR; INTRAVENOUS at 17:20

## 2024-08-14 RX ADMIN — FENTANYL CITRATE 50 MCG: 50 INJECTION, SOLUTION INTRAMUSCULAR; INTRAVENOUS at 19:29

## 2024-08-14 RX ADMIN — CEFTRIAXONE SODIUM 2 G: 1 INJECTION, POWDER, FOR SOLUTION INTRAMUSCULAR; INTRAVENOUS at 17:18

## 2024-08-14 RX ADMIN — OXYCODONE HYDROCHLORIDE 10 MG: 5 SOLUTION ORAL at 19:29

## 2024-08-14 RX ADMIN — ROCURONIUM BROMIDE 40 MG: 50 INJECTION, SOLUTION INTRAVENOUS at 16:38

## 2024-08-14 RX ADMIN — SUGAMMADEX 200 MG: 100 INJECTION, SOLUTION INTRAVENOUS at 18:52

## 2024-08-14 RX ADMIN — METRONIDAZOLE 500 MG: 5 INJECTION, SOLUTION INTRAVENOUS at 00:30

## 2024-08-14 RX ADMIN — HALOPERIDOL LACTATE 1 MG: 5 INJECTION, SOLUTION INTRAMUSCULAR at 17:10

## 2024-08-14 RX ADMIN — CEFTRIAXONE SODIUM 2000 MG: 10 INJECTION, POWDER, FOR SOLUTION INTRAVENOUS at 20:55

## 2024-08-14 RX ADMIN — VANCOMYCIN HYDROCHLORIDE 1000 MG: 5 INJECTION, POWDER, LYOPHILIZED, FOR SOLUTION INTRAVENOUS at 20:53

## 2024-08-14 RX ADMIN — OXYCODONE HYDROCHLORIDE 5 MG: 5 TABLET ORAL at 06:28

## 2024-08-14 RX ADMIN — TOBRAMYCIN AND DEXAMETHASONE: 3; 1 OINTMENT OPHTHALMIC at 23:26

## 2024-08-14 RX ADMIN — ONDANSETRON 4 MG: 2 INJECTION INTRAMUSCULAR; INTRAVENOUS at 18:42

## 2024-08-14 RX ADMIN — LIDOCAINE HYDROCHLORIDE 100 MG: 20 INJECTION, SOLUTION EPIDURAL; INFILTRATION; INTRACAUDAL at 16:38

## 2024-08-14 RX ADMIN — SODIUM CHLORIDE: 9 INJECTION, SOLUTION INTRAVENOUS at 00:02

## 2024-08-14 RX ADMIN — PROPOFOL 150 MG: 10 INJECTION, EMULSION INTRAVENOUS at 16:38

## 2024-08-14 RX ADMIN — HYDROMORPHONE HYDROCHLORIDE 0.5 MG: 1 INJECTION, SOLUTION INTRAMUSCULAR; INTRAVENOUS; SUBCUTANEOUS at 20:37

## 2024-08-14 RX ADMIN — GADOTERIDOL 13 ML: 279.3 INJECTION, SOLUTION INTRAVENOUS at 14:01

## 2024-08-14 RX ADMIN — HALOPERIDOL LACTATE 0.5 MG: 5 INJECTION, SOLUTION INTRAMUSCULAR at 18:42

## 2024-08-14 RX ADMIN — OXYCODONE HYDROCHLORIDE 5 MG: 5 TABLET ORAL at 11:03

## 2024-08-14 RX ADMIN — PROPOFOL 50 MG: 10 INJECTION, EMULSION INTRAVENOUS at 17:20

## 2024-08-14 RX ADMIN — SODIUM CHLORIDE, POTASSIUM CHLORIDE, SODIUM LACTATE AND CALCIUM CHLORIDE: 600; 310; 30; 20 INJECTION, SOLUTION INTRAVENOUS at 16:34

## 2024-08-14 RX ADMIN — METRONIDAZOLE 500 MG: 5 INJECTION, SOLUTION INTRAVENOUS at 21:00

## 2024-08-14 RX ADMIN — VANCOMYCIN HYDROCHLORIDE 1000 MG: 5 INJECTION, POWDER, LYOPHILIZED, FOR SOLUTION INTRAVENOUS at 12:21

## 2024-08-14 RX ADMIN — FENTANYL CITRATE 50 MCG: 50 INJECTION, SOLUTION INTRAMUSCULAR; INTRAVENOUS at 17:46

## 2024-08-14 RX ADMIN — MINERAL OIL, AND WHITE PETROLATUM 1 APPLICATION: 425; 573 OINTMENT OPHTHALMIC at 16:42

## 2024-08-14 RX ADMIN — CEFTRIAXONE SODIUM 2000 MG: 10 INJECTION, POWDER, FOR SOLUTION INTRAVENOUS at 06:05

## 2024-08-14 RX ADMIN — ROCURONIUM BROMIDE 10 MG: 50 INJECTION, SOLUTION INTRAVENOUS at 17:20

## 2024-08-14 RX ADMIN — METRONIDAZOLE 500 MG: 5 INJECTION, SOLUTION INTRAVENOUS at 17:18

## 2024-08-14 SDOH — ECONOMIC STABILITY: TRANSPORTATION INSECURITY
IN THE PAST 12 MONTHS, HAS THE LACK OF TRANSPORTATION KEPT YOU FROM MEDICAL APPOINTMENTS OR FROM GETTING MEDICATIONS?: NO

## 2024-08-14 SDOH — ECONOMIC STABILITY: TRANSPORTATION INSECURITY
IN THE PAST 12 MONTHS, HAS LACK OF RELIABLE TRANSPORTATION KEPT YOU FROM MEDICAL APPOINTMENTS, MEETINGS, WORK OR FROM GETTING THINGS NEEDED FOR DAILY LIVING?: NO

## 2024-08-14 ASSESSMENT — PAIN DESCRIPTION - PAIN TYPE
TYPE: ACUTE PAIN
TYPE: ACUTE PAIN
TYPE: SURGICAL PAIN
TYPE: ACUTE PAIN
TYPE: SURGICAL PAIN
TYPE: ACUTE PAIN
TYPE: ACUTE PAIN

## 2024-08-14 ASSESSMENT — COGNITIVE AND FUNCTIONAL STATUS - GENERAL
DAILY ACTIVITIY SCORE: 23
SUGGESTED CMS G CODE MODIFIER MOBILITY: CH
MOBILITY SCORE: 24
SUGGESTED CMS G CODE MODIFIER DAILY ACTIVITY: CI
TOILETING: A LITTLE

## 2024-08-14 ASSESSMENT — ENCOUNTER SYMPTOMS
BLURRED VISION: 0
PALPITATIONS: 0
DIARRHEA: 0
MYALGIAS: 0
HEADACHES: 1
EYE PAIN: 1
SORE THROAT: 0
CHILLS: 0
FEVER: 0
SHORTNESS OF BREATH: 0
ABDOMINAL PAIN: 0
SPUTUM PRODUCTION: 0
FOCAL WEAKNESS: 0
CONSTIPATION: 0
MUSCULOSKELETAL NEGATIVE: 1
COUGH: 0
PHOTOPHOBIA: 1
VOMITING: 0
NAUSEA: 0
DOUBLE VISION: 0

## 2024-08-14 ASSESSMENT — LIFESTYLE VARIABLES
EVER HAD A DRINK FIRST THING IN THE MORNING TO STEADY YOUR NERVES TO GET RID OF A HANGOVER: NO
DOES PATIENT WANT TO STOP DRINKING: NO
HAVE YOU EVER FELT YOU SHOULD CUT DOWN ON YOUR DRINKING: NO
ON A TYPICAL DAY WHEN YOU DRINK ALCOHOL HOW MANY DRINKS DO YOU HAVE: 0
TOTAL SCORE: 0
EVER FELT BAD OR GUILTY ABOUT YOUR DRINKING: NO
CONSUMPTION TOTAL: NEGATIVE
HAVE PEOPLE ANNOYED YOU BY CRITICIZING YOUR DRINKING: NO
HOW MANY TIMES IN THE PAST YEAR HAVE YOU HAD 5 OR MORE DRINKS IN A DAY: 0
ALCOHOL_USE: NO
TOTAL SCORE: 0
TOTAL SCORE: 0
AVERAGE NUMBER OF DAYS PER WEEK YOU HAVE A DRINK CONTAINING ALCOHOL: 0

## 2024-08-14 ASSESSMENT — SOCIAL DETERMINANTS OF HEALTH (SDOH)
WITHIN THE PAST 12 MONTHS, YOU WORRIED THAT YOUR FOOD WOULD RUN OUT BEFORE YOU GOT THE MONEY TO BUY MORE: SOMETIMES TRUE
WITHIN THE PAST 12 MONTHS, THE FOOD YOU BOUGHT JUST DIDN'T LAST AND YOU DIDN'T HAVE MONEY TO GET MORE: SOMETIMES TRUE
WITHIN THE PAST 12 MONTHS, THE FOOD YOU BOUGHT JUST DIDN'T LAST AND YOU DIDN'T HAVE MONEY TO GET MORE: SOMETIMES TRUE
WITHIN THE PAST 12 MONTHS, YOU WORRIED THAT YOUR FOOD WOULD RUN OUT BEFORE YOU GOT THE MONEY TO BUY MORE: SOMETIMES TRUE
WITHIN THE LAST YEAR, HAVE YOU BEEN AFRAID OF YOUR PARTNER OR EX-PARTNER?: NO
IN THE PAST 12 MONTHS, HAS THE ELECTRIC, GAS, OIL, OR WATER COMPANY THREATENED TO SHUT OFF SERVICE IN YOUR HOME?: NO
IN THE PAST 12 MONTHS, HAS THE ELECTRIC, GAS, OIL, OR WATER COMPANY THREATENED TO SHUT OFF SERVICE IN YOUR HOME?: NO
WITHIN THE LAST YEAR, HAVE YOU BEEN HUMILIATED OR EMOTIONALLY ABUSED IN OTHER WAYS BY YOUR PARTNER OR EX-PARTNER?: NO
WITHIN THE LAST YEAR, HAVE YOU BEEN KICKED, HIT, SLAPPED, OR OTHERWISE PHYSICALLY HURT BY YOUR PARTNER OR EX-PARTNER?: NO
WITHIN THE LAST YEAR, HAVE TO BEEN RAPED OR FORCED TO HAVE ANY KIND OF SEXUAL ACTIVITY BY YOUR PARTNER OR EX-PARTNER?: NO

## 2024-08-14 ASSESSMENT — PATIENT HEALTH QUESTIONNAIRE - PHQ9
2. FEELING DOWN, DEPRESSED, IRRITABLE, OR HOPELESS: NOT AT ALL
SUM OF ALL RESPONSES TO PHQ9 QUESTIONS 1 AND 2: 0
1. LITTLE INTEREST OR PLEASURE IN DOING THINGS: NOT AT ALL

## 2024-08-14 NOTE — PROGRESS NOTES
MRI negative for cavernous sinus thrombosis    Given presepatal cellulitis secondary to sinusitis, ENT still planning on operative source control around 4pm today.    Will discontinue heparin  Continue abx    Vick Mchugh M.D.

## 2024-08-14 NOTE — ASSESSMENT & PLAN NOTE
Ruled out on MRI  She does have extensive sinusitis causing preseptal cellulitis on the right eye  S/p OR with ENT for washout    ID consulted for antibiotic recommendations  Doing well postoperatively, advance diet and transferred to floor

## 2024-08-14 NOTE — CONSULTS
"ID consult cavernous sinus thrombosis   From ER \"seen at outside ED for two weeks of flu like symptoms, nasal drainage and right sided facial pain/swelling which began on last Monday. CT-max/face showed cavernous sinus thrombosis and sinusitis/cellulitis, given heparin, rocephin and vancomycin prior to transfer\"  Neuro and ENT consulted  Currently on vanco, ceftriaxone, flagyl  If MRSA + and blood cultures neg-change vanco to Zyvox   FIONA Mchugh  "

## 2024-08-14 NOTE — DIETARY
"Nutrition Services: Initial Assessment  Day 1 of admit.  Katelynn Montanez is a 28 y.o. female with admitting DX of cavernous sinus thrombosis.      Consult received for MST 3 with reported weight loss and poor PO intake.      Nutrition Assessment:   Height: 165.1 cm (5' 5\")  Weight: 73.5 kg (162 lb) - other healthcare, needs measured weight  Body mass index is 26.96 kg/m²., BMI classification: overweight   Wt Readings from Last 5 Encounters:   08/13/24 73.5 kg (162 lb)   08/14/24 65 kg (143 lb 4.8 oz)   05/19/24 74.3 kg (163 lb 12.8 oz)   05/12/24 74.2 kg (163 lb 9.3 oz)      Objective:   Pertinent medical hx: Per H&P, pt denies any chronic conditions.  Pertinent labs: Creat 0.47, AST 10  Pertinent meds: senna, vancomycin, anti-emetics prn, bowel protocol, NS IV  Skin/wounds: No wounds or edema noted.   Food Allergies: NKA to food.   Last BM: 8/12      Current diet order/intake: NPO    Subjective:   Pt seen at bedside with use of  line for Citizen of [place] with Levy ID#[id number]. Pt reports eating 1 small meal per day for 1 month r/t having fevers , decreased appetite and overall not feeling well. Denies N/V/D.   Patient reported UBW: 150 lbs. Pt reports this weight 4 months ago and current weight is 162 lbs via other healthcare weight, pt reports clothes fitting looser.   Nutrition Focused Physical Exam (NFPE)   Weight loss: Last weight on file is 163 lbs on 5/19/24, need measured weight to confirm weight trend.  Muscle mass:  Adequate  Subcutaneous fat: Adequate   Fluid Accumulation: No edema noted.   Reduced  Strength N/A in acute care setting.      Based on RD assessment at this time, pt does not meet criteria in congruence with ASPEN/Academy guidelines for malnutrition.        Nutrition Diagnosis: (PES)      Inadequate oral intake r/t fevers and decreased appetite as evidenced by eating <50% of meals for 1 month     Nutrition Interventions:   Regular diet when appropriate. PT is agreeable to snacks and " vanilla oral supplement once diet advances.   Fluids per MD.   Patient aware of active plan of care as appropriate.     Nutrition Monitoring and Evaluation:   Monitor for adequate PO intake.   Monitor nutrition POC  Monitor vital signs pertinent to nutrition (labs, meds weight trend).      RD following.

## 2024-08-14 NOTE — ED NOTES
Heparin started from previous ER:      Heparin bolus 4,000 unit at 2041    Heparin drip at 12 units/hr at 2046

## 2024-08-14 NOTE — CONSULTS
Neurology Initial Consult H&P  Neurohospitalist Service, [facility]    Referring Physician: Vick Mchugh M.D.    Chief Complaint   Patient presents with    Other    Flu Like Symptoms       HPI: Late entry due to system being down overnight.      28-year-old female transferred from an outside facility.  Patient had flulike symptoms for the past 2 weeks.  Presented yesterday with worsening facial pain.  Periorbital redness and swelling.  Found to have bilateral sinusitis and cavernous sinus thrombosis.  Patient's been started on heparin..  No family history of blood clots.  Does not take birth control.        Review of systems: In addition to what is detailed in the HPI above, (and scanned into the chart if and when applicable), all other systems reviewed and are negative.    Past Medical History:    has no past medical history on file.    FHx:  family history is not on file.    SHx:   reports that she has never smoked. She has never used smokeless tobacco. She reports that she does not use drugs.    Allergies:  No Known Allergies    Medications:    Current Facility-Administered Medications:     metroNIDAZOLE (Flagyl) IVPB 500 mg, 500 mg, Intravenous, Q12HRS, Claudio Mcdonough M.D., Stopped at 08/14/24 0130    heparin infusion 25,000 units in 500 mL 0.45% NACL, 0-30 Units/kg/hr, Intravenous, Continuous, Claudio Mcdonough M.D., Last Rate: 26.5 mL/hr at 08/14/24 0334, 18 Units/kg/hr at 08/14/24 0334    heparin injection 2,900 Units, 40 Units/kg, Intravenous, PRN, Claudio Mcdonough M.D.    MD Alert...Vancomycin per Pharmacy, , Other, PHARMACY TO DOSE, Claudio Mcdonough M.D.    cefTRIAXone (Rocephin) syringe 2,000 mg, 2,000 mg, Intravenous, Q12HRS, Claudio Mcdonough M.D., 2,000 mg at 08/14/24 0605    hydrALAZINE (Apresoline) injection 10 mg, 10 mg, Intravenous, Q4HRS PRN, Claudio Mcdonough M.D.    oxyCODONE immediate-release (Roxicodone) tablet 2.5-5 mg, 2.5-5 mg, Oral, Q4HRS PRN, Claudio CHRISTOPHER  LIVIER Mcdonough, 5 mg at 08/14/24 0628    HYDROmorphone (Dilaudid) injection 0.5 mg, 0.5 mg, Intravenous, Q3HRS PRN, Claudio Mcdonough M.D.    acetaminophen (Tylenol) tablet 650 mg, 650 mg, Oral, Q6HRS PRN, Claudio Mcdonough M.D.    senna-docusate (Pericolace Or Senokot S) 8.6-50 MG per tablet 2 Tablet, 2 Tablet, Oral, BID **AND** polyethylene glycol/lytes (Miralax) Packet 1 Packet, 1 Packet, Oral, QDAY PRN, Claudio Mcdonough M.D.    NS infusion, , Intravenous, Continuous, Claudio Mcdonough M.D., Last Rate: 75 mL/hr at 08/14/24 0310, Restarted at 08/14/24 0310    ondansetron (Zofran) syringe/vial injection 4 mg, 4 mg, Intravenous, Q4HRS PRN, Claudio Mcdonough M.D.    ondansetron (Zofran ODT) dispertab 4 mg, 4 mg, Oral, Q4HRS PRN, Claudio Mcdonough M.D.    promethazine (Phenergan) tablet 12.5-25 mg, 12.5-25 mg, Oral, Q4HRS PRN, Claudio Mcdonough M.D.    promethazine (Phenergan) suppository 12.5-25 mg, 12.5-25 mg, Rectal, Q4HRS PRN, Claudio Mcdonough M.D.    prochlorperazine (Compazine) injection 5-10 mg, 5-10 mg, Intravenous, Q4HRS PRN, Claudio Mcdonough M.D.    vancomycin (Vancocin) 1,000 mg in  mL IVPB, 1,000 mg, Intravenous, Q8HR, Claudio Mcdonough M.D., Stopped at 08/14/24 0707    Physical Examination:     Vitals:    08/14/24 0300 08/14/24 0400 08/14/24 0500 08/14/24 0600   BP: 92/61 100/64 99/64 96/56   Pulse: 67 (!) 55 60 (!) 59   Resp: 19 14 16 15   Temp:  36.6 °C (97.8 °F)     TempSrc:  Oral     SpO2: 93% 95% 94% 94%   Weight:       Height:             NEUROLOGICAL EXAM:       Patient is awake alert [place] x 4.  Speech is intact.  Extraocular muscles are intact.  Visual field appears to be intact.  Pupils equal reactive.  There is periorbital swelling mostly on the right side with some redness.  Cannot visualize the fundus well.  Questionable papilledema on the right.  Face look symmetrical.  Hearing intact.  Midline tongue.  Shoulder shrug intact.  Sustained antigravity and in all 4.  Sensation  "intact.  No signs ataxia.  Gait normal.  Reflexes normal downgoing toes.    Objective Data:    Labs:  Lab Results   Component Value Date/Time    PROTHROMBTM 14.7 (H) 08/14/2024 02:15 AM    INR 1.13 08/14/2024 02:15 AM      Lab Results   Component Value Date/Time    WBC 6.4 08/14/2024 02:15 AM    RBC 4.11 (L) 08/14/2024 02:15 AM    HEMOGLOBIN 11.9 (L) 08/14/2024 02:15 AM    HEMATOCRIT 36.4 (L) 08/14/2024 02:15 AM    MCV 88.6 08/14/2024 02:15 AM    MCH 29.0 08/14/2024 02:15 AM    MCHC 32.7 08/14/2024 02:15 AM    MPV 8.7 (L) 08/14/2024 02:15 AM    NEUTSPOLYS 41.30 (L) 08/14/2024 02:15 AM    LYMPHOCYTES 44.30 (H) 08/14/2024 02:15 AM    MONOCYTES 8.70 08/14/2024 02:15 AM    EOSINOPHILS 4.80 08/14/2024 02:15 AM    BASOPHILS 0.60 08/14/2024 02:15 AM      Lab Results   Component Value Date/Time    SODIUM 137 08/14/2024 02:15 AM    POTASSIUM 3.8 08/14/2024 02:15 AM    CHLORIDE 105 08/14/2024 02:15 AM    CO2 20 08/14/2024 02:15 AM    GLUCOSE 93 08/14/2024 02:15 AM    BUN 8 08/14/2024 02:15 AM    CREATININE 0.47 (L) 08/14/2024 02:15 AM      No results found for: \"CHOLSTRLTOT\", \"LDL\", \"HDL\", \"TRIGLYCERIDE\"    Lab Results   Component Value Date/Time    ALKPHOSPHAT 62 08/14/2024 02:15 AM    ASTSGOT 10 (L) 08/14/2024 02:15 AM    ALTSGPT 10 08/14/2024 02:15 AM    TBILIRUBIN <0.2 08/14/2024 02:15 AM        Imaging/Testing:  CT-STEALTH HEAD W/O    (Results Pending)         Assessment and Plan:    28-year-old female transferred for cavernous sinus necrosis most likely secondary to ongoing infection with sinusitis.  Agree fluids anticoagulation with heparin.  May proceed with MRI brain with and without contrast.  Agree with neurosurgery consultation.ENT pending      Plan:  1.  MRI brain with without contrast.  2.  Continue with heparin.  3. Agree with ENT consult        The evaluation of the patient, and recommended management, was discussed with the resident staff.       This chart was partially generated using voice recognition " technology and sound alike word replacement may be present, best efforts were made to make the chart accurate.    Claudio Guillen MD  Board Certified Neurology, ABPN  t) [phone]

## 2024-08-14 NOTE — HOSPITAL COURSE
8/13 - admitted to the ICU, started on heparin and antibiotics.    8/14 - ENT washed out sinuses, MRI with no cavernous sinus thrombosis but there is preseptal cellulitis.  ID consulted    8/15 - doing well postoperatively, follow-up cultures, transfer to floor.

## 2024-08-14 NOTE — PROGRESS NOTES
Brief neurology note    MRI brain does not show any signs of sinus venous thrombosis.  Therefore okay to discontinue heparin.  Neurology will sign off.

## 2024-08-14 NOTE — PROGRESS NOTES
1530: Pt taken to pre op with ACLS RN on monitor with  at beside. Report called previously to pre op.

## 2024-08-14 NOTE — H&P
[facility] ICU Resident Progress Note      Admit Date: 8/13/2024    Resident(s): Min Webber M.D.   Attending:  ANTONIO GUO/ Dr. Mcdonough    Patient ID:    Name:  Katelynn Thibodeaux     YOB: 1995  Age:  28 y.o.  female   MRN:  [id number]    Hospital Course (carried forward and updated):  Katelynn Montanez is a 28 y.o. female who was admitted on 8/13/2024 for cavernous sinus thrombosis.    Interpretor utilized during interview. 2 week ago patient developed flu like symptoms and right sided facial pain.  Reports she was seen at an outside ED and given Augmentin and Tylenol. On 8/12/24 she noted significant worsening of her facial pain, along with right face swelling. Pain goes down from forehead to neck of right side. No vision changes, but has some pain with movement of her eyes.    Denies history or family history of coagulation disorders. Denies any chronic medical conditions or medications. Denies hormonal contraceptive use, states she is not currently pregnant.    Presented to an [facility] ED, where CT maxillofacial demonstrated filling defects within the cavernous sinuses bilaterally, consistent with cavernous sinus thrombosis. Placed on heparin drip, transferred to [facility] for Neurosurgery and ENT consultation. On arrival stable vitals with temp 98.2, HR 66, RR 17, /73, 96% RA. CBC with WBC 7.7, Hgb 12.6, platelets 358. CMP unremarkable. Continued on heparin drip, Neurosurgery, Neurology, and ENT were consulted by the ED.    Social History:  Tobacco: Denies  Alcohol: Denies current  Recreational drugs (illegal or prescription): Denies    Consultants:  Critical Care  Neurosurgery  Neurology  ENT     NEURO:   Q1h Neuro checks ongoing  CARDIOVASC:  Hemodynamically stable, not on pressors  RESPIRATORY:  Not on any supplemental O2  GI/NUTRITION:  NPO  RENAL/FLUID/LYTES: On 75ml/hr NS  HEME/ONC:   On heparin infusion  INFECTIOUS D:  Vanc, Ceftriaxone, and Flagyl for cavernous  "thrombosis  ENDOCRINE:   N/A    Vitals Range last 24h:  Temp:  [36.8 °C (98.2 °F)] 36.8 °C (98.2 °F)  Pulse:  [60-66] 60  Resp:  [16-19] 16  BP: (101-124)/(64-73) 101/64  SpO2:  [95 %-96 %] 95 %    No intake or output data in the 24 hours ending 08/14/24 0312     Review of Systems   Constitutional:  Negative for chills and fever.   HENT:  Positive for congestion.    Eyes:  Positive for photophobia (R) and pain (with movement (R)). Negative for blurred vision and double vision.   Respiratory:  Negative for cough and shortness of breath.    Cardiovascular:  Negative for chest pain (Reports some tenderness to palpation of sternal area) and leg swelling.   Gastrointestinal:  Negative for abdominal pain, constipation, diarrhea, nausea and vomiting.   Genitourinary:  Negative for dysuria and hematuria.   Musculoskeletal:  Negative for joint pain and myalgias.   Neurological:  Positive for headaches (R).       PHYSICAL EXAM:  Vitals:    08/13/24 2100 08/13/24 2150 08/13/24 2200 08/13/24 2300   BP:  124/73 112/69 101/64   Pulse:  66 60 60   Resp:  17 19 16   Temp:  36.8 °C (98.2 °F)     TempSrc:  Temporal     SpO2:  96% 96% 95%   Weight: 73.5 kg (162 lb)      Height: 1.651 m (5' 5\")       Body mass index is 26.96 kg/m².    O2 therapy: Pulse Oximetry: 95 %, O2 Delivery Device: None - Room Air     Physical Exam  Constitutional:       General: She is not in acute distress.     Appearance: Normal appearance. She is not ill-appearing.   HENT:      Head: Normocephalic and atraumatic. Right periorbital erythema present.      Right Ear: External ear normal.      Left Ear: External ear normal.      Nose: Nose normal.      Mouth/Throat:      Mouth: Mucous membranes are moist.   Eyes:      General:         Right eye: No discharge.         Left eye: No discharge.      Extraocular Movements: Extraocular movements intact.      Comments: Slight erythema of R eye sclera   Cardiovascular:      Rate and Rhythm: Normal rate and regular " rhythm.      Heart sounds: No murmur heard.     No gallop.   Pulmonary:      Effort: Pulmonary effort is normal. No respiratory distress.      Breath sounds: No wheezing or rales.   Abdominal:      General: There is no distension.      Palpations: Abdomen is soft.      Tenderness: There is no abdominal tenderness. There is no guarding or rebound.   Musculoskeletal:         General: No swelling or tenderness.      Right lower leg: No edema.      Left lower leg: No edema.   Skin:     General: Skin is warm.      Coloration: Skin is not jaundiced.      Findings: No rash.   Neurological:      Mental Status: She is alert and oriented to person, place, and time. Mental status is at baseline.   Psychiatric:         Mood and Affect: Mood normal.         Behavior: Behavior normal.                Recent Labs     08/13/24 2204 08/14/24 0215   SODIUM 136 137   POTASSIUM 4.1 3.8   CHLORIDE 104 105   CO2 20 20   BUN 10 8   CREATININE 0.52 0.47*   MAGNESIUM  --  1.8   PHOSPHORUS  --  3.3   CALCIUM 8.8 8.3*     Recent Labs     08/13/24 2204 08/14/24 0215   ALTSGPT 14 10   ASTSGOT 13 10*   ALKPHOSPHAT 71 62   TBILIRUBIN <0.2 <0.2   GLUCOSE 108* 93     Recent Labs     08/13/24 2204 08/14/24 0215   RBC 4.32 4.11*   HEMOGLOBIN 12.6 11.9*   HEMATOCRIT 38.1 36.4*   PLATELETCT 358 317   PROTHROMBTM  --  14.7*   INR  --  1.13     Recent Labs     08/13/24 2204 08/14/24 0215   WBC 7.7 6.4   NEUTSPOLYS 44.20 41.30*   LYMPHOCYTES 41.60* 44.30*   MONOCYTES 8.90 8.70   EOSINOPHILS 4.70 4.80   BASOPHILS 0.50 0.60   ASTSGOT 13 10*   ALTSGPT 14 10   ALKPHOSPHAT 71 62   TBILIRUBIN <0.2 <0.2       Meds:   metroNIDAZOLE (FLAGYL) IV  500 mg      heparin  0-30 Units/kg/hr 18 Units/kg/hr (08/13/24 2248)    heparin  40 Units/kg      MD Alert...Vancomycin per Pharmacy        cefTRIAXone (ROCEPHIN) IV  2,000 mg      hydrALAZINE  10 mg      oxyCODONE immediate-release  2.5-5 mg      HYDROmorphone  0.5 mg      acetaminophen  650 mg      senna-docusate   2 Tablet      And    polyethylene glycol/lytes  1 Packet      NS   75 mL/hr at 08/14/24 0002    ondansetron  4 mg      ondansetron  4 mg      promethazine  12.5-25 mg      promethazine  12.5-25 mg      prochlorperazine  5-10 mg      vancomycin  1,000 mg          Procedures:  N/A    Imaging:  CT-STEALTH HEAD W/O    (Results Pending)       ASSESSEMENT and PLAN:    Cavernous sinus thrombosis- (present on admission)  Assessment & Plan  Seen on CT prior to transfer  Likely secondary to infection, CT also signs of sinusitis and signs of cellulitis  Does not seem to have risk factors for hypercoagulable state    Heparin gtt  Empiric ceftriaxone, flagyl, and vancomycin  ENT, neuro, and NSGY consulted  Neuro checks q1h  SBP < 160  Follow cultures  Follow MRSA PCR  PRN antiemetics and analgesics    Bilateral acute sinusitis- (present on admission)  Assessment & Plan  Seen on CT. Patient reports she was on Augmentin outpatient   Antibiotics and cultures as above    Periorbital cellulitis- (present on admission)  Assessment & Plan  Antibiotics and cultures as above    DISPO: ICU    CODE STATUS: FULL CODE    Quality Measures:  Feeding: NPO   Analgesia: PRN Tylenol Oxy Dilaudid  Sedation: Not on any sedation  Thromboprophylaxis: On heparin gtt  Head of bed: >30 degrees  Ulcer prophylaxis: N/A  Glycemic control: N/A  Bowel care: bowel regimen  Indwelling lines: PIV  Deescalation of antibiotics: Can check MRSA PCR for consideration of Vanc d/c    Min Webber  PGY-2 [facility] Internal [facility] Resident

## 2024-08-14 NOTE — ANESTHESIA PREPROCEDURE EVALUATION
Case: [id number] Date/Time: 08/14/24 1617    Procedure: MAXILLARY ANTROSTOMY WITH STEALTH (Nose)    Anesthesia type: General    Pre-op diagnosis: cavernous sinus thrombosis    Location: [facility] OR 12 / SURGERY [facility]    Surgeons: Angelita Yu M.D.            Relevant Problems   Other   (positive) Acute sinusitis   (positive) Periorbital cellulitis of right eye       Physical Exam    Airway   Mallampati: II  TM distance: >3 FB  Neck ROM: full       Cardiovascular - normal exam  Rhythm: regular  Rate: normal  (-) murmur     Dental - normal exam           Pulmonary - normal exam  Breath sounds clear to auscultation     Abdominal    Neurological - normal exam                 Anesthesia Plan    ASA 2       Plan - general       Airway plan will be ETT          Induction: intravenous    Postoperative Plan: Postoperative administration of opioids is intended.    Pertinent diagnostic labs and testing reviewed    Informed Consent:    Anesthetic plan and risks discussed with patient.    Use of blood products discussed with: patient whom consented to blood products.

## 2024-08-14 NOTE — ED TRIAGE NOTES
"Chief Complaint   Patient presents with    Other    Flu Like Symptoms     BIB EMS from [facility] ER,    Patient had a flu like symptoms for 2 weeks and right facial pain, noted redness on right eye, CT done from previous facility and found to have a Sinus thrombus. On heparin drip upon arrival. Denies any headache, no dizziness, no chest pain    AOX4 FFH69st room air. Received  Vancomycin and Rocephine from previous facility     was used during the triage process.    /73   Pulse 66   Temp 36.8 °C (98.2 °F) (Temporal)   Resp 17   Ht 1.651 m (5' 5\")   Wt 73.5 kg (162 lb)   SpO2 96%   BMI 26.96 kg/m²     "

## 2024-08-14 NOTE — ED PROVIDER NOTES
ER Provider Note    Scribed for Rebecca Umana M.D. by Dwight Dia. 8/13/2024   9:53 PM    Primary Care Provider: Pcp Pt States None    CHIEF COMPLAINT  Chief Complaint   Patient presents with    Other    Flu Like Symptoms     EXTERNAL RECORDS REVIEWED  External ED Note Reviewed note from outside ED, presented for two weeks of flu like symptoms, resolved for one week then had nasal drainage and right sided facial pain. Labs showed no leukocytosis, negative pregnancy, INR 1.0. CT-maxillofacial showed: 1. Filling defects within the cavernous sinuses bilaterally consistent with cavernous sinus thrombosis. 2. Acute bilateral maxillary sinusitis. 3. Right premaxillary soft tissue swelling, with associated overlying skin thickening, compatible with cellulitis    HPI/ROS    LIMITATION TO HISTORY   Select: Language Chinese,  Used     Katelynn Montanez is a 28 y.o. female who presents to the ED for evaluation of right sided facial pain onset one weeks ago. She was seen at outside ED for two weeks of flu like symptoms, nasal drainage and right sided facial pain/swelling which began on last Monday. CT-max/face showed cavernous sinus thrombosis and sinusitis/cellulitis, given heparin, rocephin and vancomycin prior to transfer. Sent here for neurosurgery and ENT consultation, Dr. Lynn is aware of patient. She states that her vision is normal right now. She has some pain with movement of her eyes, this was much worse when she originally got to outside ED.    PAST MEDICAL HISTORY  History reviewed. No pertinent past medical history.    SURGICAL HISTORY  History reviewed. No pertinent surgical history.    FAMILY HISTORY  History reviewed. No pertinent family history.    SOCIAL HISTORY   reports that she has never smoked. She has never used smokeless tobacco. She reports that she does not use drugs.    CURRENT MEDICATIONS  There are no discharge medications for this patient.      ALLERGIES  No Known  "Allergies     PHYSICAL EXAM  /73   Pulse 66   Temp 36.8 °C (98.2 °F) (Temporal)   Resp 17   Ht 1.651 m (5' 5\")   Wt 73.5 kg (162 lb)   SpO2 96%   BMI 26.96 kg/m²    General: Alert female lying on gurney  Head: Normocephalic atraumatic  Eyes: Extraocular motion intact albeit painful.  Right sided periorbital edema and erythema. Conjunctival injection, no proptosis.  Neck: Supple, no rigidity  Cardiovascular: Regular rate and rhythm no murmurs rubs or gallops  Respiratory: Clear to auscultation bilaterally, equal chest rise and fall, no increased work of breathing  Abdomen: Soft nontender no guarding  Musculoskeletal: Warm and well perfused, no peripheral edema  Neuro: Alert, no focal deficits. CN 2-12 intact, occasionally has esotropia of right eye which resolves with effort.  5 out of 5 strength with upper and lower extremities.  Integumentary: No wounds or rashes     DIAGNOSTIC STUDIES    Labs:   Labs Reviewed   CBC WITH DIFFERENTIAL - Abnormal; Notable for the following components:       Result Value    MPV 8.8 (*)     Lymphocytes 41.60 (*)     All other components within normal limits   COMP METABOLIC PANEL - Abnormal; Notable for the following components:    Glucose 108 (*)     All other components within normal limits   ESTIMATED GFR   PROTHROMBIN TIME   MRSA BY PCR (AMP)   BLOOD CULTURE   BLOOD CULTURE     All labs reviewed by me.     INITIAL ASSESSMENT COURSE AND PLAN  Care Narrative     9:53 PM - Patient was evaluated at bedside. She presents as a transfer from free standing ED, found to have cavernous sinus thrombosis, sinusitis and cellulitis, sent here for neurosurgery and ENT consultation. Started on vancomycin, rocephin and heparin. Patient verbalizes understanding and support with my plan of care.     10:12 PM I discussed the patient's case and the above findings with Dr. Lynn (Neurosurgery) who recommends neurology and ENT consultation, admission to neuro ICU.     10:20 PM I discussed " the patient's case and the above findings with Dr. Guillen (Neurology) who agrees to consult with the patient.     11:04 PM I discussed the patient's case and the above findings with Dr. Mcdonough (Intensivist) who agrees to admit the patient to neuro ICU for frequent neurochecks.     11:06 PM I discussed the patient's case and the above findings with Dr. Yu (ENT) who will consult on the patient in the morning, likely will need surgical intervention.    ED OBS: No; Patient does not meet criteria for ED Observation.   I reviewed the time of administration of heparin, vancomycin and ceftriaxone, and patient does not need additional doses while in the emergency department.  Patient is not septic, thus blood cultures will not be ordered.    DISPOSITION AND DISCUSSIONS    I have discussed management of the patient with the following physicians and NIKKI's:  Dr. Lynn (Neurosurgery),  Dr. Guillen (Neurology), Dr. Mcdonough (Intensivist), Dr. Yu (ENT)     Discussion of management with other [facility] or appropriate source(s): None     Barriers to care at this time, including but not limited to: Patient does not have established PCP.     Decision tools and prescription drugs considered including, but not limited to: Antibiotics already given .    CRITICAL CARE  The very real possibilty of a deterioration of this patient's condition required the highest level of my preparedness for sudden, emergent intervention.  I provided critical care services, which included medication orders, frequent reevaluations of the patient's condition and response to treatment, ordering and reviewing test results, and discussing the case with various consultants.  The critical care time associated with the care of the patient was 35 minutes. Review chart for interventions. This time is exclusive of any other billable procedures.      DISPOSITION:  Patient will be hospitalized by Dr. Mcdonough in critical condition.    FINAL DIAGNOSIS  1. Cavernous  sinus thrombosis    2. Acute non-recurrent maxillary sinusitis    3. Periorbital cellulitis of right eye         Dwight KAUR (Scribe), am scribing for, and in the presence of, Prosper Umana M.D..    Electronically signed by: Dwight Dia (Cyndiibsamira), 8/13/2024    Prosper KAUR M.D. personally performed the services described in this documentation, as scribed by Dwight Dia in my presence, and it is both accurate and complete.      The note accurately reflects work and decisions made by me.  Prosper Umana M.D.  8/14/2024  12:08 AM

## 2024-08-14 NOTE — PROGRESS NOTES
4 Eyes Skin Assessment Completed by GINA RN and LISBET Dean.    Head Swelling - under right eye  Ears WDL  Nose WDL  Mouth WDL  Neck WDL  Breast/Chest WDL  Shoulder Blades WDL  Spine WDL  (R) Arm/Elbow/Hand WDL  (L) Arm/Elbow/Hand WDL  Abdomen WDL  Groin WDL  Scrotum/Coccyx/Buttocks WDL  (R) Leg Scar - right knee  (L) Leg WDL  (R) Heel/Foot/Toe WDL  (L) Heel/Foot/Toe WDL          Devices In Places ECG, Blood Pressure Cuff, Pulse Ox, and SCD's      Interventions In Place TAP System, Pillows, Low Air Loss Mattress, and Heels Loaded W/Pillows    Possible Skin Injury No    Pictures Uploaded Into Epic N/A  Wound Consult Placed N/A  RN Wound Prevention Protocol Ordered Yes

## 2024-08-14 NOTE — CARE PLAN
The patient is Watcher - Medium risk of patient condition declining or worsening    Shift Goals  Clinical Goals: pain control; heparin therapeutic; neuro checks  Patient Goals: sleep  Family Goals: MATTY    Progress made toward(s) clinical / shift goals:    Problem: Pain - Standard  Goal: Alleviation of pain or a reduction in pain to the patient’s comfort goal  Outcome: Progressing     Problem: Knowledge Deficit - Standard  Goal: Patient and family/care givers will demonstrate understanding of plan of care, disease process/condition, diagnostic tests and medications  Outcome: Progressing     Problem: Psychosocial  Goal: Patient's ability to identify and develop effective coping behaviors will improve  Outcome: Progressing       Patient is not progressing towards the following goals:

## 2024-08-14 NOTE — PROGRESS NOTES
Critical Care Progress Note    Date of admission  8/13/2024    Chief Complaint  28 y.o. female with cavernous sinus thrombosis thought secondary to sinusitis/facial cellulitis.  ENT and neurology consulted.  Started on antibiotics and heparin infusion.    Hospital Course  8/13 - admitted to the ICU, started on heparin and antibiotics.    8/14 - ENT planning surgical cleanout of sinuses.  MRI pending.  ID consult.    Interval Problem Update  Reviewed last 24 hour events:  Tmax: Afebrile  Diet: N.p.o.  Vasopressors: None    Infusions:   Heparin    Antibx:   Ceftriaxone 8/14 - present  Flagyl 8/14 - present   Vancomycin 8/14 - present    Intake / Output  Urine Output past 24 hours: 700mL    Lab Trends  WBC 7 -->  6.4  Hgb 12.6 --> 12    CMP WNL    Pregnancy negative    Review of Systems  Review of Systems   Constitutional:  Positive for malaise/fatigue. Negative for chills and fever.   HENT:  Positive for congestion. Negative for sore throat.    Eyes:  Positive for photophobia and pain.   Respiratory:  Negative for sputum production and shortness of breath.    Cardiovascular:  Negative for chest pain and palpitations.   Gastrointestinal:  Negative for abdominal pain, nausea and vomiting.   Genitourinary: Negative.    Musculoskeletal: Negative.    Skin: Negative.    Neurological:  Positive for headaches. Negative for focal weakness.   All other systems reviewed and are negative.       Vital Signs for last 24 hours   Temp:  [36.1 °C (97 °F)-36.8 °C (98.2 °F)] 36.1 °C (97 °F)  Pulse:  [55-81] 57  Resp:  [7-40] 15  BP: ()/(56-78) 98/64  SpO2:  [93 %-98 %] 95 %    Hemodynamic parameters for last 24 hours       Respiratory Information for the last 24 hours       Physical Exam   Physical Exam  Vitals and nursing note reviewed. Exam conducted with a chaperone present.   Constitutional:       General: She is not in acute distress.     Appearance: Normal appearance. She is not ill-appearing.   HENT:      Head:  Normocephalic.      Mouth/Throat:      Mouth: Mucous membranes are moist.   Eyes:      Extraocular Movements: Extraocular movements intact.      Comments: Right periorbital edema   Cardiovascular:      Rate and Rhythm: Normal rate and regular rhythm.      Pulses: Normal pulses.   Pulmonary:      Effort: Pulmonary effort is normal. No respiratory distress.   Abdominal:      General: There is no distension.      Palpations: Abdomen is soft.      Tenderness: There is no abdominal tenderness. There is no guarding or rebound.   Musculoskeletal:         General: Normal range of motion.      Cervical back: Normal range of motion and neck supple.   Skin:     General: Skin is warm and dry.      Capillary Refill: Capillary refill takes less than 2 seconds.   Neurological:      General: No focal deficit present.      Mental Status: She is alert and oriented to person, place, and time. Mental status is at baseline.         Medications  Current Facility-Administered Medications   Medication Dose Route Frequency Provider Last Rate Last Admin    metroNIDAZOLE (Flagyl) IVPB 500 mg  500 mg Intravenous Q12HRS Claudio Mcdonough M.D.   Stopped at 08/14/24 0130    heparin infusion 25,000 units in 500 mL 0.45% NACL  0-30 Units/kg/hr Intravenous Continuous Claudio Mcdonough M.D. 23.5 mL/hr at 08/14/24 0909 16 Units/kg/hr at 08/14/24 0909    heparin injection 2,900 Units  40 Units/kg Intravenous PRN Claudio Mcdonough M.D. MD Alert...Vancomycin per Pharmacy   Other PHARMACY TO DOSE Claudio Mcdonough M.D.        cefTRIAXone (Rocephin) syringe 2,000 mg  2,000 mg Intravenous Q12HRS Claudio Mcdonough M.D.   2,000 mg at 08/14/24 0605    hydrALAZINE (Apresoline) injection 10 mg  10 mg Intravenous Q4HRS PRN Claudio Mcdonough M.D.        oxyCODONE immediate-release (Roxicodone) tablet 2.5-5 mg  2.5-5 mg Oral Q4HRS PRN Claudio Mcdonough M.D.   5 mg at 08/14/24 1103    HYDROmorphone (Dilaudid) injection 0.5 mg  0.5 mg Intravenous Q3HRS PRN  Claudio Mcdonough M.D.        acetaminophen (Tylenol) tablet 650 mg  650 mg Oral Q6HRS PRN Claudio Mcdonough M.D.        senna-docusate (Pericolace Or Senokot S) 8.6-50 MG per tablet 2 Tablet  2 Tablet Oral BID Claudio Mcdonough M.D.        And    polyethylene glycol/lytes (Miralax) Packet 1 Packet  1 Packet Oral QDAY PRN Claudio Mcdonough M.D.        NS infusion   Intravenous Continuous Claudio Mcdonough M.D. 75 mL/hr at 08/14/24 0310 Restarted at 08/14/24 0310    ondansetron (Zofran) syringe/vial injection 4 mg  4 mg Intravenous Q4HRS PRN Claudio Mcdonough M.D.        ondansetron (Zofran ODT) dispertab 4 mg  4 mg Oral Q4HRS PRN Claudio Mcdonough M.D.        promethazine (Phenergan) tablet 12.5-25 mg  12.5-25 mg Oral Q4HRS PRN Claudio Mcdonough M.D.        promethazine (Phenergan) suppository 12.5-25 mg  12.5-25 mg Rectal Q4HRS PRN Claudio Mcdonough M.D.        prochlorperazine (Compazine) injection 5-10 mg  5-10 mg Intravenous Q4HRS PRN Claudio Mcdonough M.D.        vancomycin (Vancocin) 1,000 mg in  mL IVPB  1,000 mg Intravenous Q8HR Claudio Mcdonough M.D. 125 mL/hr at 08/14/24 1221 1,000 mg at 08/14/24 1221       Fluids    Intake/Output Summary (Last 24 hours) at 8/14/2024 1226  Last data filed at 8/14/2024 1100  Gross per 24 hour   Intake 665.45 ml   Output 1200 ml   Net -534.55 ml       Laboratory          Recent Labs     08/13/24 2204 08/14/24 0215   SODIUM 136 137   POTASSIUM 4.1 3.8   CHLORIDE 104 105   CO2 20 20   BUN 10 8   CREATININE 0.52 0.47*   MAGNESIUM  --  1.8   PHOSPHORUS  --  3.3   CALCIUM 8.8 8.3*     Recent Labs     08/13/24 2204 08/14/24  0215   ALTSGPT 14 10   ASTSGOT 13 10*   ALKPHOSPHAT 71 62   TBILIRUBIN <0.2 <0.2   GLUCOSE 108* 93     Recent Labs     08/13/24  2204 08/14/24  0215   WBC 7.7 6.4   NEUTSPOLYS 44.20 41.30*   LYMPHOCYTES 41.60* 44.30*   MONOCYTES 8.90 8.70   EOSINOPHILS 4.70 4.80   BASOPHILS 0.50 0.60   ASTSGOT 13 10*   ALTSGPT 14 10   ALKPHOSPHAT 71 62   TBILIRUBIN  <0.2 <0.2     Recent Labs     08/13/24  2204 08/14/24  0215   RBC 4.32 4.11*   HEMOGLOBIN 12.6 11.9*   HEMATOCRIT 38.1 36.4*   PLATELETCT 358 317   PROTHROMBTM  --  14.7*   INR  --  1.13       Imaging  CT:    Reviewed    Assessment/Plan  * Cavernous sinus thrombosis- (present on admission)  Assessment & Plan  08/13/24 admitted to the ICU per the recommendations of NSGY    Heparin gtt titrated by Xa will transition to Lovenox when cleared by ENT post operatively  Empiric ceftriaxone and vancomycin  ENT, neuro, and NSGY consulted  ID consulted  Neuro checks  SBP < 160  Follow cultures    MRI brain with and without pending  ENT planning on OR for cleanout of sinuses on 8/14    Acute sinusitis- (present on admission)  Assessment & Plan  Seen on CT. Patient reports she was on Augmentin outpatient   Antibiotics and cultures as above    Going to OR with ENT today for clean out  MRI brain with and without pending    ID consult given secondary cavernous sinus thrombosis    Periorbital cellulitis of right eye- (present on admission)  Assessment & Plan  Antibiotics and cultures as above         VTE:   Heparin infusion  Ulcer: Not Indicated  Lines: None    I have performed a physical exam and reviewed and updated ROS and Plan today (8/14/2024). In review of yesterday's note (8/13/2024), there are no changes except as documented above.     Discussed patient condition and risk of morbidity and/or mortality with RN, RT, Pharmacy, Charge nurse / hot rounds, Patient, and ENT, neurology, and neurosurgery    The patient remains critically ill.  She has a neurovascular infection / thrombosis which poses imminent threat to her vision and central neurologic functioning which requires careful monitoring in the ICU.  Critical care time = 39 minutes in directly providing and coordinating critical care and extensive data review.  No time overlap and excludes procedures.

## 2024-08-14 NOTE — PROGRESS NOTES
Spoke with Dr. Mchugh in regard to stopping heparin gtt prior to surgery. Pt is scheduled for 1630, verified with Pre-op that time is still correct. Per MD okay to stop heparin gtt now. Pt down to MRI okay to go off tele without RN.

## 2024-08-14 NOTE — CONSULTS
"CC: eye swelling    HPI: Katelynn Montanez is a 28 y.o. female with 2 weeks of viral symptoms and a couple days of right eye swelling and pain. CT yesterday showed significant sinusitis, though clear right sphenoid. There was questionable cavernous sinus thrombosis in addition to periorbital cellulitis. Started on IV antibiotics and anticoagulation. Reports chronic nasal congestion and pressure, but no prior severe sinus infections. Had some blurry vision yesterday, but reports vision normal this morning.    History reviewed. No pertinent past medical history.  History reviewed. No pertinent surgical history.  No current facility-administered medications on file prior to encounter.     No current outpatient medications on file prior to encounter.     No Known Allergies  Family and Occupational History     Socioeconomic History    Marital status: Single     Spouse name: Not on file    Number of children: Not on file    Years of education: Not on file    Highest education level: Not on file   Occupational History    Not on file         O:  BP 96/56   Pulse (!) 59   Temp 36.6 °C (97.8 °F) (Oral)   Resp 15   Ht 1.651 m (5' 5\")   Wt 73.5 kg (162 lb)   SpO2 94%   Gen: interactive and appropriate  Pulm: Breathing comfortably on RA without stridor or stertor  Face: right periorbital edema and mild erythema, some tenderness to palpation  Eyes: mild chemosis. Extraocular movements entirely intact. Vision grossly intact bilaterally  Ears: pinna normal. Hearing grossly intact  Nose: patent, with moist mucosa. no purulence  OC/OP: clear, no masses. Dentition intact. Floor of mouth soft and flat  Neck: flat, no discrete masses  Lymph: no palpable lymphadenopathy in neck or parotid  Neuro: cranial nerves grossly intact bilaterally. Mood appropriate      Recent Labs     08/13/24  2204 08/14/24  0215   WBC 7.7 6.4   RBC 4.32 4.11*   HEMOGLOBIN 12.6 11.9*   HEMATOCRIT 38.1 36.4*   MCV 88.2 88.6   MCH 29.2 29.0   MCHC 33.1 " 32.7   RDW 41.4 41.5   PLATELETCT 358 317   MPV 8.8* 8.7*     Recent Labs     08/13/24  2204 08/14/24  0215   SODIUM 136 137   POTASSIUM 4.1 3.8   CHLORIDE 104 105   CO2 20 20   GLUCOSE 108* 93   BUN 10 8   CREATININE 0.52 0.47*   CALCIUM 8.8 8.3*     Recent Labs     08/14/24  0215   INR 1.13       CT reviewed    A/P:Katelynn Montanez is a 28 y.o. female with sinusitis, periorbital cellulitis, possible cavernous sinus thrombosis. CT stealth ordered for surgery. Discussed CT results with radiology - no visible thrombosis on scan today. Abnormality noted on outside scan may be volume averaging in cavernous sinus. Agree with further imaging to visualize cavernous sinus, MRI recommended by neurology and I agree. For either cavernous sinus thrombosis or periorbital cellulitis associated with sinusitis, sinus surgery is helpful for source control. Will plan for surgery this afternoon if ok to pause heparin drip. Discussed with patient via . Surgery discussed in detail, including risks.     Thank you for the consultation. Please call with questions or concerns.    Angelita Yu M.D.  [phone]

## 2024-08-14 NOTE — ASSESSMENT & PLAN NOTE
Seen on CT. Patient reports she was on Augmentin outpatient   Antibiotics and cultures as above    MRI without evidence of cavernous sinus thrombosis  S/p OR with ENT for washout of sinuses    De-escalate antibiotics per cultures

## 2024-08-14 NOTE — PROGRESS NOTES
Pharmacy Vancomycin Kinetics Note for 8/13/2024     28 y.o. female on Vancomycin day # 1     Vancomycin Indication (Trough based Dosing): CNS infection (goal of 18-22)    Provider specified end date: 08/23/24    Active Antibiotics (From admission, onward)      Ordered     Ordering Provider       Tue Aug 13, 2024 11:21 PM    08/13/24 2321  vancomycin (Vancocin) 1,000 mg in  mL IVPB  (vancomycin (VANCOCIN) IV (LD + Maintenance))  EVERY 8 HOURS         Claudio Mcdonough M.D.       Tue Aug 13, 2024 10:51 PM    08/13/24 2251  cefTRIAXone (Rocephin) syringe 2,000 mg  EVERY 12 HOURS         Claudio Mcdonough M.D.    08/13/24 2251  MD Alert...Vancomycin per Pharmacy  (MD Alert...Vancomycin per Pharmacy)  PHARMACY TO DOSE        Question:  Indication(s) for vancomycin?  Answer:  Central nervous system infection    Claudio Mcdonough M.D.          Dosing Weight: 73 kg (160 lb 15 oz)    Admission History: Admitted on 8/13/2024 for Cavernous sinus thrombosis [G08]  Pertinent history: 29 yo female transferred to [facility] with cavernous sinus thrombosis and possible deep sinus infection. Patient was on augnmentin without improvement and was given ceftriaxone and vancomycin 1g at 20:40.  Patient is afebrile and intial WBC normal.    Allergies: Patient has no known allergies.     Pertinent cultures to date:   Results       Procedure Component Value Units Date/Time    BLOOD CULTURE [[id number]]     Order Status: Sent Specimen: Blood from Peripheral     BLOOD CULTURE [[id number]]     Order Status: Sent Specimen: Blood from Peripheral     MRSA By PCR (Amp) [[id number]]     Order Status: Sent Specimen: Respirate from Nares           Labs: Estimated Creatinine Clearance: 161.7 mL/min (by C-G formula based on SCr of 0.52 mg/dL).  Recent Labs     08/13/24 2204   WBC 7.7   NEUTSPOLYS 44.20     Recent Labs     08/13/24 2204   BUN 10   CREATININE 0.52   ALBUMIN 3.9     No intake or output data in the 24 hours ending 08/13/24 2322   /73   " Pulse 66   Temp 36.8 °C (98.2 °F) (Temporal)   Resp 17   Ht 1.651 m (5' 5\")   Wt 73.5 kg (162 lb)   SpO2 96%  Temp (24hrs), Av.8 °C (98.2 °F), Min:36.8 °C (98.2 °F), Max:36.8 °C (98.2 °F)    List concerns for Vancomycin clearance: Receipt of contrast dye    A/P:   -  Vancomycin dose: 1000mg (~15mg/kg) q8h    -  Next vancomycin level(s):  not ordered    -  Comments: Limited information available at this time. ENT and neurosurgery to evaluate for possible intervention.  Review notes, labs, and other workup and determine if CNS dosing vs soft tissue dosing is warranted.    Juwan Martinez, PharmD, BCPS    "

## 2024-08-14 NOTE — PROGRESS NOTES
CT called to clarify if Dr Yu wanted CT stealth or regular CT but ENT MD had already signed off; asked Dr Mcdonough if he knew, he stated to just wait a few hours and day team can ask Dr. Yu. Will pass it onto days for clarification.

## 2024-08-15 LAB
ALBUMIN SERPL BCP-MCNC: 3.4 G/DL (ref 3.2–4.9)
ALBUMIN/GLOB SERPL: 1.2 G/DL
ALP SERPL-CCNC: 63 U/L (ref 30–99)
ALT SERPL-CCNC: 9 U/L (ref 2–50)
ANION GAP SERPL CALC-SCNC: 8 MMOL/L (ref 7–16)
AST SERPL-CCNC: 11 U/L (ref 12–45)
BASOPHILS # BLD AUTO: 0.4 % (ref 0–1.8)
BASOPHILS # BLD: 0.04 K/UL (ref 0–0.12)
BILIRUB SERPL-MCNC: <0.2 MG/DL (ref 0.1–1.5)
BUN SERPL-MCNC: 4 MG/DL (ref 8–22)
CALCIUM ALBUM COR SERPL-MCNC: 8.4 MG/DL (ref 8.5–10.5)
CALCIUM SERPL-MCNC: 7.9 MG/DL (ref 8.5–10.5)
CHLORIDE SERPL-SCNC: 105 MMOL/L (ref 96–112)
CO2 SERPL-SCNC: 20 MMOL/L (ref 20–33)
CREAT SERPL-MCNC: 0.52 MG/DL (ref 0.5–1.4)
EOSINOPHIL # BLD AUTO: 0.01 K/UL (ref 0–0.51)
EOSINOPHIL NFR BLD: 0.1 % (ref 0–6.9)
ERYTHROCYTE [DISTWIDTH] IN BLOOD BY AUTOMATED COUNT: 39.9 FL (ref 35.9–50)
FUNGUS SPEC FUNGUS STN: NORMAL
FUNGUS SPEC FUNGUS STN: NORMAL
GFR SERPLBLD CREATININE-BSD FMLA CKD-EPI: 129 ML/MIN/1.73 M 2
GLOBULIN SER CALC-MCNC: 2.9 G/DL (ref 1.9–3.5)
GLUCOSE SERPL-MCNC: 117 MG/DL (ref 65–99)
GRAM STN SPEC: NORMAL
GRAM STN SPEC: NORMAL
HCT VFR BLD AUTO: 35.5 % (ref 37–47)
HGB BLD-MCNC: 11.8 G/DL (ref 12–16)
IMM GRANULOCYTES # BLD AUTO: 0.04 K/UL (ref 0–0.11)
IMM GRANULOCYTES NFR BLD AUTO: 0.4 % (ref 0–0.9)
LYMPHOCYTES # BLD AUTO: 1.03 K/UL (ref 1–4.8)
LYMPHOCYTES NFR BLD: 10.3 % (ref 22–41)
MAGNESIUM SERPL-MCNC: 1.7 MG/DL (ref 1.5–2.5)
MCH RBC QN AUTO: 29.1 PG (ref 27–33)
MCHC RBC AUTO-ENTMCNC: 33.2 G/DL (ref 32.2–35.5)
MCV RBC AUTO: 87.4 FL (ref 81.4–97.8)
MONOCYTES # BLD AUTO: 0.67 K/UL (ref 0–0.85)
MONOCYTES NFR BLD AUTO: 6.7 % (ref 0–13.4)
NEUTROPHILS # BLD AUTO: 8.22 K/UL (ref 1.82–7.42)
NEUTROPHILS NFR BLD: 82.1 % (ref 44–72)
NRBC # BLD AUTO: 0 K/UL
NRBC BLD-RTO: 0 /100 WBC (ref 0–0.2)
PATHOLOGY CONSULT NOTE: NORMAL
PHOSPHATE SERPL-MCNC: 3.1 MG/DL (ref 2.5–4.5)
PLATELET # BLD AUTO: 334 K/UL (ref 164–446)
PMV BLD AUTO: 8.7 FL (ref 9–12.9)
POTASSIUM SERPL-SCNC: 4.4 MMOL/L (ref 3.6–5.5)
PROT SERPL-MCNC: 6.3 G/DL (ref 6–8.2)
RBC # BLD AUTO: 4.06 M/UL (ref 4.2–5.4)
SIGNIFICANT IND 70042: NORMAL
SITE SITE: NORMAL
SODIUM SERPL-SCNC: 133 MMOL/L (ref 135–145)
SOURCE SOURCE: NORMAL
VANCOMYCIN PEAK SERPL-MCNC: 20.7 UG/ML (ref 20–40)
WBC # BLD AUTO: 10 K/UL (ref 4.8–10.8)

## 2024-08-15 PROCEDURE — A9270 NON-COVERED ITEM OR SERVICE: HCPCS | Performed by: INTERNAL MEDICINE

## 2024-08-15 PROCEDURE — 80053 COMPREHEN METABOLIC PANEL: CPT

## 2024-08-15 PROCEDURE — 700111 HCHG RX REV CODE 636 W/ 250 OVERRIDE (IP): Mod: JZ | Performed by: INTERNAL MEDICINE

## 2024-08-15 PROCEDURE — 99255 IP/OBS CONSLTJ NEW/EST HI 80: CPT | Performed by: INTERNAL MEDICINE

## 2024-08-15 PROCEDURE — 700111 HCHG RX REV CODE 636 W/ 250 OVERRIDE (IP): Mod: JZ | Performed by: STUDENT IN AN ORGANIZED HEALTH CARE EDUCATION/TRAINING PROGRAM

## 2024-08-15 PROCEDURE — 83735 ASSAY OF MAGNESIUM: CPT

## 2024-08-15 PROCEDURE — 99254 IP/OBS CNSLTJ NEW/EST MOD 60: CPT | Performed by: INTERNAL MEDICINE

## 2024-08-15 PROCEDURE — 700102 HCHG RX REV CODE 250 W/ 637 OVERRIDE(OP): Performed by: INTERNAL MEDICINE

## 2024-08-15 PROCEDURE — 85025 COMPLETE CBC W/AUTO DIFF WBC: CPT

## 2024-08-15 PROCEDURE — 99233 SBSQ HOSP IP/OBS HIGH 50: CPT | Performed by: STUDENT IN AN ORGANIZED HEALTH CARE EDUCATION/TRAINING PROGRAM

## 2024-08-15 PROCEDURE — 700105 HCHG RX REV CODE 258: Performed by: INTERNAL MEDICINE

## 2024-08-15 PROCEDURE — 700102 HCHG RX REV CODE 250 W/ 637 OVERRIDE(OP): Performed by: OTOLARYNGOLOGY

## 2024-08-15 PROCEDURE — A9270 NON-COVERED ITEM OR SERVICE: HCPCS | Performed by: OTOLARYNGOLOGY

## 2024-08-15 PROCEDURE — 700101 HCHG RX REV CODE 250: Performed by: INTERNAL MEDICINE

## 2024-08-15 PROCEDURE — 700111 HCHG RX REV CODE 636 W/ 250 OVERRIDE (IP): Performed by: INTERNAL MEDICINE

## 2024-08-15 PROCEDURE — 770006 HCHG ROOM/CARE - MED/SURG/GYN SEMI*

## 2024-08-15 PROCEDURE — 84100 ASSAY OF PHOSPHORUS: CPT

## 2024-08-15 PROCEDURE — 80202 ASSAY OF VANCOMYCIN: CPT

## 2024-08-15 RX ORDER — ENOXAPARIN SODIUM 100 MG/ML
40 INJECTION SUBCUTANEOUS DAILY
Status: DISCONTINUED | OUTPATIENT
Start: 2024-08-15 | End: 2024-08-18 | Stop reason: HOSPADM

## 2024-08-15 RX ORDER — LINEZOLID 600 MG/1
600 TABLET, FILM COATED ORAL EVERY 12 HOURS
Status: DISCONTINUED | OUTPATIENT
Start: 2024-08-15 | End: 2024-08-18 | Stop reason: HOSPADM

## 2024-08-15 RX ADMIN — Medication 2 SPRAY: at 13:38

## 2024-08-15 RX ADMIN — SENNOSIDES AND DOCUSATE SODIUM 2 TABLET: 50; 8.6 TABLET ORAL at 16:52

## 2024-08-15 RX ADMIN — ENOXAPARIN SODIUM 40 MG: 100 INJECTION SUBCUTANEOUS at 18:05

## 2024-08-15 RX ADMIN — AMPICILLIN SODIUM, SULBACTAM SODIUM 3 G: 2; 1 INJECTION, POWDER, FOR SOLUTION INTRAMUSCULAR; INTRAVENOUS at 16:53

## 2024-08-15 RX ADMIN — Medication 2 SPRAY: at 21:18

## 2024-08-15 RX ADMIN — TOBRAMYCIN AND DEXAMETHASONE: 3; 1 OINTMENT OPHTHALMIC at 21:20

## 2024-08-15 RX ADMIN — LINEZOLID 600 MG: 600 TABLET, FILM COATED ORAL at 10:15

## 2024-08-15 RX ADMIN — Medication 2 SPRAY: at 16:53

## 2024-08-15 RX ADMIN — Medication 2 SPRAY: at 10:15

## 2024-08-15 RX ADMIN — CEFTRIAXONE SODIUM 2000 MG: 10 INJECTION, POWDER, FOR SOLUTION INTRAVENOUS at 05:54

## 2024-08-15 RX ADMIN — AMPICILLIN SODIUM, SULBACTAM SODIUM 3 G: 2; 1 INJECTION, POWDER, FOR SOLUTION INTRAMUSCULAR; INTRAVENOUS at 11:56

## 2024-08-15 RX ADMIN — OXYCODONE HYDROCHLORIDE 5 MG: 5 TABLET ORAL at 18:04

## 2024-08-15 RX ADMIN — OXYMETAZOLINE HCL 2 SPRAY: 0.05 SPRAY NASAL at 03:02

## 2024-08-15 RX ADMIN — VANCOMYCIN HYDROCHLORIDE 1000 MG: 5 INJECTION, POWDER, LYOPHILIZED, FOR SOLUTION INTRAVENOUS at 04:50

## 2024-08-15 RX ADMIN — LINEZOLID 600 MG: 600 TABLET, FILM COATED ORAL at 16:52

## 2024-08-15 RX ADMIN — TOBRAMYCIN AND DEXAMETHASONE 1 DROP: 1; 3 SUSPENSION/ DROPS OPHTHALMIC at 21:19

## 2024-08-15 RX ADMIN — ACETAMINOPHEN 650 MG: 325 TABLET ORAL at 10:19

## 2024-08-15 RX ADMIN — OXYCODONE HYDROCHLORIDE 5 MG: 5 TABLET ORAL at 13:14

## 2024-08-15 RX ADMIN — TOBRAMYCIN AND DEXAMETHASONE 1 DROP: 1; 3 SUSPENSION/ DROPS OPHTHALMIC at 10:15

## 2024-08-15 RX ADMIN — TOBRAMYCIN AND DEXAMETHASONE 1 DROP: 1; 3 SUSPENSION/ DROPS OPHTHALMIC at 16:55

## 2024-08-15 RX ADMIN — OXYMETAZOLINE HCL 2 SPRAY: 0.05 SPRAY NASAL at 05:26

## 2024-08-15 RX ADMIN — HYDROMORPHONE HYDROCHLORIDE 0.5 MG: 1 INJECTION, SOLUTION INTRAMUSCULAR; INTRAVENOUS; SUBCUTANEOUS at 20:20

## 2024-08-15 RX ADMIN — TOBRAMYCIN AND DEXAMETHASONE 1 DROP: 1; 3 SUSPENSION/ DROPS OPHTHALMIC at 13:38

## 2024-08-15 RX ADMIN — METRONIDAZOLE 500 MG: 5 INJECTION, SOLUTION INTRAVENOUS at 06:00

## 2024-08-15 ASSESSMENT — ENCOUNTER SYMPTOMS
ABDOMINAL PAIN: 0
NAUSEA: 0
TINGLING: 0
CHILLS: 0
SORE THROAT: 0
DIZZINESS: 0
WEIGHT LOSS: 0
HEADACHES: 0
CONSTIPATION: 0
COUGH: 0
HALLUCINATIONS: 0
PHOTOPHOBIA: 1
PALPITATIONS: 0
SENSORY CHANGE: 0
NECK PAIN: 0
FOCAL WEAKNESS: 0
DIARRHEA: 0
ORTHOPNEA: 0
FEVER: 0
HEADACHES: 1
BACK PAIN: 0
MYALGIAS: 0
BLURRED VISION: 0
SHORTNESS OF BREATH: 0
MUSCULOSKELETAL NEGATIVE: 1
PHOTOPHOBIA: 0
SPUTUM PRODUCTION: 0
TREMORS: 0
DOUBLE VISION: 0
VOMITING: 0
EYE PAIN: 0
SPEECH CHANGE: 0
EYE PAIN: 1

## 2024-08-15 ASSESSMENT — PAIN DESCRIPTION - PAIN TYPE
TYPE: ACUTE PAIN
TYPE: SURGICAL PAIN
TYPE: ACUTE PAIN
TYPE: SURGICAL PAIN
TYPE: ACUTE PAIN
TYPE: ACUTE PAIN
TYPE: SURGICAL PAIN
TYPE: SURGICAL PAIN

## 2024-08-15 ASSESSMENT — LIFESTYLE VARIABLES: SUBSTANCE_ABUSE: 0

## 2024-08-15 NOTE — PROGRESS NOTES
Critical Care Progress Note    Date of admission  8/13/2024    Chief Complaint  28 y.o. female with cavernous sinus thrombosis thought secondary to sinusitis/facial cellulitis.  ENT and neurology consulted.  Started on antibiotics and heparin infusion.    Hospital Course  8/13 - admitted to the ICU, started on heparin and antibiotics.    8/14 - ENT washed out sinuses, MRI with no cavernous sinus thrombosis but there is preseptal cellulitis.  ID consulted    8/15 - doing well postoperatively, follow-up cultures, transfer to floor.    Interval Problem Update  Reviewed last 24 hour events:  Tmax: Afebrile  Diet: Regular diet  Vasopressors: None    Infusions: None    Antibx:   Unasyn 8/15 - present  Linezolid 8/15 - present    Ceftriaxone 8/14 - 8/15  Flagyl 8/14 - 8/15  Vancomycin 8/14 - 8/15    Intake / Output  Urine Output past 24 hours: 1700mL    Lab Trends  Hgb stable ~12    CMP WNL    Pregnancy negative    Review of Systems  Review of Systems   Constitutional:  Positive for malaise/fatigue. Negative for chills and fever.   HENT:  Positive for congestion. Negative for sore throat.    Eyes:  Positive for photophobia and pain.   Respiratory:  Negative for sputum production and shortness of breath.    Cardiovascular:  Negative for chest pain and palpitations.   Gastrointestinal:  Negative for abdominal pain, nausea and vomiting.   Genitourinary: Negative.    Musculoskeletal: Negative.    Skin: Negative.    Neurological:  Positive for headaches. Negative for focal weakness.   All other systems reviewed and are negative.       Vital Signs for last 24 hours   Temp:  [36.1 °C (97 °F)-37.3 °C (99.1 °F)] 36.9 °C (98.4 °F)  Pulse:  [57-90] 84  Resp:  [7-29] 18  BP: ()/(50-73) 98/58  SpO2:  [94 %-100 %] 96 %    Hemodynamic parameters for last 24 hours       Respiratory Information for the last 24 hours       Physical Exam   Physical Exam  Vitals and nursing note reviewed. Exam conducted with a chaperone present.    Constitutional:       General: She is not in acute distress.     Appearance: Normal appearance. She is not ill-appearing.   HENT:      Head: Normocephalic.      Mouth/Throat:      Mouth: Mucous membranes are moist.   Eyes:      Extraocular Movements: Extraocular movements intact.      Comments: Right periorbital edema   Cardiovascular:      Rate and Rhythm: Normal rate and regular rhythm.      Pulses: Normal pulses.   Pulmonary:      Effort: Pulmonary effort is normal. No respiratory distress.   Abdominal:      General: There is no distension.      Palpations: Abdomen is soft.      Tenderness: There is no abdominal tenderness. There is no guarding or rebound.   Musculoskeletal:         General: Normal range of motion.      Cervical back: Normal range of motion and neck supple.   Skin:     General: Skin is warm and dry.      Capillary Refill: Capillary refill takes less than 2 seconds.   Neurological:      General: No focal deficit present.      Mental Status: She is alert and oriented to person, place, and time. Mental status is at baseline.         Medications  Current Facility-Administered Medications   Medication Dose Route Frequency Provider Last Rate Last Admin    metroNIDAZOLE (Flagyl) IVPB 500 mg  500 mg Intravenous Q12HRS Claudio Mcdonough M.D. 100 mL/hr at 08/15/24 0600 500 mg at 08/15/24 0600    sodium chloride (Ocean) 0.65 % nasal spray 2 Spray  2 Spray Nasal 4X/DAY Angelita Yu M.D.        tobramycin-dexamethasone (Tobradex) 0.3-0.1 % ophthalmic suspension 1 Drop  1 Drop Right Eye 4X/DAY Angelita Yu M.D.   1 Drop at 08/14/24 2326    tobramycin-dexamethasone (Tobradex) ophthalmic ointment   Both Eyes Nightly Angelita Yu M.D.   Given at 08/14/24 2326    MD Alert...Vancomycin per Pharmacy   Other PHARMACY TO DOSE Claudio Mcdonough M.D.        cefTRIAXone (Rocephin) syringe 2,000 mg  2,000 mg Intravenous Q12HRS Claudio Mcdonough M.D.   2,000 mg at 08/15/24 0554    hydrALAZINE (Apresoline)  injection 10 mg  10 mg Intravenous Q4HRS PRN Claudio Mcdonough M.D.        oxyCODONE immediate-release (Roxicodone) tablet 2.5-5 mg  2.5-5 mg Oral Q4HRS PRLILIAM Mcdonough M.D.   5 mg at 08/14/24 1103    HYDROmorphone (Dilaudid) injection 0.5 mg  0.5 mg Intravenous Q3HRS PRLILIAM Mcdonough M.D.   0.5 mg at 08/14/24 2037    acetaminophen (Tylenol) tablet 650 mg  650 mg Oral Q6HRS PRN Claudio Mcdonough M.D.        senna-docusate (Pericolace Or Senokot S) 8.6-50 MG per tablet 2 Tablet  2 Tablet Oral BID Claudio Mcdonough M.D.        And    polyethylene glycol/lytes (Miralax) Packet 1 Packet  1 Packet Oral QDAY PRN Claudio Mcdonough M.D.        ondansetron (Zofran) syringe/vial injection 4 mg  4 mg Intravenous Q4HRS PRLILIAM Mcdonough M.D.        ondansetron (Zofran ODT) dispertab 4 mg  4 mg Oral Q4HRS PRLILIAM Mcdonough M.D.        promethazine (Phenergan) tablet 12.5-25 mg  12.5-25 mg Oral Q4HRS PRLILIAM Mcdonough M.D.        promethazine (Phenergan) suppository 12.5-25 mg  12.5-25 mg Rectal Q4HRS PRLILIAM Mcdonough M.D.        prochlorperazine (Compazine) injection 5-10 mg  5-10 mg Intravenous Q4HRS PRLILIAM Mcdonough M.D.        vancomycin (Vancocin) 1,000 mg in  mL IVPB  1,000 mg Intravenous Q8HR Claudio Mcdonough M.D. 125 mL/hr at 08/15/24 0600 Rate Verify at 08/15/24 0600       Fluids    Intake/Output Summary (Last 24 hours) at 8/15/2024 0726  Last data filed at 8/15/2024 0600  Gross per 24 hour   Intake 2989.85 ml   Output 2350 ml   Net 639.85 ml       Laboratory          Recent Labs     08/13/24  2204 08/14/24  0215 08/15/24  0445   SODIUM 136 137 133*   POTASSIUM 4.1 3.8 4.4   CHLORIDE 104 105 105   CO2 20 20 20   BUN 10 8 4*   CREATININE 0.52 0.47* 0.52   MAGNESIUM  --  1.8 1.7   PHOSPHORUS  --  3.3 3.1   CALCIUM 8.8 8.3* 7.9*     Recent Labs     08/13/24  2204 08/14/24  0215 08/15/24  0445   ALTSGPT 14 10 9   ASTSGOT 13 10* 11*   ALKPHOSPHAT 71 62 63   TBILIRUBIN <0.2 <0.2 <0.2    GLUCOSE 108* 93 117*     Recent Labs     08/13/24 2204 08/14/24  0215 08/15/24  0445   WBC 7.7 6.4 10.0   NEUTSPOLYS 44.20 41.30* 82.10*   LYMPHOCYTES 41.60* 44.30* 10.30*   MONOCYTES 8.90 8.70 6.70   EOSINOPHILS 4.70 4.80 0.10   BASOPHILS 0.50 0.60 0.40   ASTSGOT 13 10* 11*   ALTSGPT 14 10 9   ALKPHOSPHAT 71 62 63   TBILIRUBIN <0.2 <0.2 <0.2     Recent Labs     08/13/24 2204 08/14/24  0215 08/15/24  0445   RBC 4.32 4.11* 4.06*   HEMOGLOBIN 12.6 11.9* 11.8*   HEMATOCRIT 38.1 36.4* 35.5*   PLATELETCT 358 317 334   PROTHROMBTM  --  14.7*  --    INR  --  1.13  --        Imaging  CT:    Reviewed    Assessment/Plan  * Cavernous sinus thrombosis- (present on admission)  Assessment & Plan  Ruled out on MRI  She does have extensive sinusitis causing preseptal cellulitis on the right eye  S/p OR with ENT for washout    ID consulted for antibiotic recommendations  Doing well postoperatively, advance diet and transferred to floor    Acute sinusitis- (present on admission)  Assessment & Plan  Seen on CT. Patient reports she was on Augmentin outpatient   Antibiotics and cultures as above    MRI without evidence of cavernous sinus thrombosis  S/p OR with ENT for washout of sinuses    De-escalate antibiotics per cultures    Periorbital cellulitis of right eye- (present on admission)  Assessment & Plan  Antibiotics and cultures as above         VTE:  Lovenox  Ulcer: Not Indicated  Lines: None    I have performed a physical exam and reviewed and updated ROS and Plan today (8/15/2024). In review of yesterday's note (8/14/2024), there are no changes except as documented above.     Discussed patient condition and risk of morbidity and/or mortality with RN, RT, Pharmacy, Charge nurse / hot rounds, Patient, and ENT, neurology, and neurosurgery    Ok to transfer patient out of ICU today. [facility] will sign off at transfer. Please call with questions.

## 2024-08-15 NOTE — ANESTHESIA TIME REPORT
Anesthesia Start and Stop Event Times       Date Time Event    8/14/2024 1630 Ready for Procedure     1634 Anesthesia Start     1903 Anesthesia Stop          Responsible Staff  08/14/24      Name Role Begin End    Wood Sinha M.D. Anesth 1634 1715    Shivam Schneider D.O. Anesth 1715 1903          Overtime Reason:  no overtime (within assigned shift)    Comments:

## 2024-08-15 NOTE — ANESTHESIA PROCEDURE NOTES
Airway    Date/Time: 8/14/2024 4:40 PM    Performed by: Wood Sinha M.D.  Authorized by: Wood Sinha M.D.    Location:  OR  Urgency:  Elective  Difficult Airway: No    Indications for Airway Management:  Anesthesia      Spontaneous Ventilation: absent    Sedation Level:  Deep  Preoxygenated: Yes    Patient Position:  Sniffing  Mask Difficulty Assessment:  1 - vent by mask  Final Airway Type:  Endotracheal airway  Final Endotracheal Airway:  ETT and KOLTON tube  Cuffed: Yes    Technique Used for Successful ETT Placement:  Direct laryngoscopy    Insertion Site:  Oral  Blade Type:  Mcdonough  Laryngoscope Blade/Videolaryngoscope Blade Size:  2  ETT Size (mm):  7.0  Measured from:  Teeth  ETT to Teeth (cm):  21  Placement Verified by: auscultation and capnometry    Cormack-Lehane Classification:  Grade I - full view of glottis  Number of Attempts at Approach:  1

## 2024-08-15 NOTE — PROGRESS NOTES
"S: NAEON. Pain well controlled. Some congestion. Eye feels better    O:  BP 98/67   Pulse 88   Temp 36.8 °C (98.2 °F) (Temporal)   Resp (!) 23   Ht 1.651 m (5' 5\")   Wt 73.5 kg (162 lb)   SpO2 96%   Gen: interactive and appropriate, Nepalese speaking  Pulm: Breathing comfortably on RA without stridor or stertor  Face/eyes: right periorbital edema much improved/essentially resolved, chemosis resolved. Some mild conjunctival injectionExtraocular movements entirely intact. Vision grossly intact bilaterally  Nose: patent, minimal drainage    Neck: flat, no discrete masses  Neuro: cranial nerves grossly intact bilaterally. Mood appropriate      Recent Labs     08/13/24  2204 08/14/24  0215 08/15/24  0445   WBC 7.7 6.4 10.0   RBC 4.32 4.11* 4.06*   HEMOGLOBIN 12.6 11.9* 11.8*   HEMATOCRIT 38.1 36.4* 35.5*   MCV 88.2 88.6 87.4   MCH 29.2 29.0 29.1   MCHC 33.1 32.7 33.2   RDW 41.4 41.5 39.9   PLATELETCT 358 317 334   MPV 8.8* 8.7* 8.7*     Recent Labs     08/13/24  2204 08/14/24  0215 08/15/24  0445   SODIUM 136 137 133*   POTASSIUM 4.1 3.8 4.4   CHLORIDE 104 105 105   CO2 20 20 20   GLUCOSE 108* 93 117*   BUN 10 8 4*   CREATININE 0.52 0.47* 0.52   CALCIUM 8.8 8.3* 7.9*     Recent Labs     08/14/24 0215   INR 1.13         A/P:Katelynn Montanez is a 28 y.o. female with sinusitis, periorbital cellulitis, now s/p bilateral sinus surgery 8/14. No cavernous sinus thrombosis on more detailed imaging. Doing well. Pain well controlled. Cultures pending. Eye much better today. On tobradex otic drops/ointment at night. Antibiotics per ID, appreciate assistance. Recommend saline spray multiple times per day, already ordered. Will need follow up with me in 1-2 weeks.     Thank you for the consultation. Please call with questions or concerns.     Angelita Yu M.D.  [phone]  "

## 2024-08-15 NOTE — OR NURSING
1900 Patient arrived from OR. Responding to voice, maintaining own airway. Received report from Dr. Schneider and Nehemiah MANNING RN, assumed care. VSS. No dressing or packing present. No drainage from nose, no s/s of pain or nausea noted. Orders reviewed and implemented.  1920 Patient awake, oriented x 4.  used for assessment. Denies nausea.  1929 Oxycodone and Fentanyl administered for reported pain. 1940 Patient resting comfortably, reports pain improved and tolerable. VSS. No bleeding or drainage from surgical area noted. 1956 Report to Eloisa MANNING RN.  2020 Patient discharged back to room with monitor, RN accompanying transport.

## 2024-08-15 NOTE — OP REPORT
DATE OF SERVICE: 08/14/24     PREOPERATIVE DIAGNOSES:  1. Severe acute maxillary sinusitis  2. Severe acute ethmoid sinusitis  3. Severe acute sphenoid sinusitis  4. Severe acute sinusitis  5. Nasal septal deviation  6. Inferior turbinate hypertrophy  7. Periorbital cellulitis     POSTOPERATIVE DIAGNOSES.  1. Severe acute maxillary sinusitis  2. Severe acute ethmoid sinusitis  3. Severe acute sphenoid sinusitis  4. Severe acute sinusitis  5. Nasal septal deviation  6. Inferior turbinate hypertrophy  7. Periorbital cellulitis     PROCEDURES:  1.  Bilateral maxillary antrostomy.  2.  Bilateral sphenoethmoidectomy.  3. Bilateral frontal sinusotomy  4. Septoplasty   5. Bilateral Inferior turbinate submucous resection with outfracture  6.  Utilization of image guidance, extradural.     SURGEON:  Angelita Yu MD     ANESTHESIA:  General.     ANESTHESIOLOGIST: su fernando and riya ferrara.    ESTIMATED BLOOD LOSS:  100 mL.     FINDINGS:      Severe polypoid edema in all paranasal sinuses  Thick mucous in right and left maxillary and right frontal sinuses  Septal spur to the left     COMPLICATIONS:  None.     SPECIMENS:  Right and left sinus contents to pathology.     INDICATIONS FOR PROCEDURE:  The patient is a 28-year-old female with a   longstanding history of nasal obstruction, but had severe URI symptoms starting about 2 weeks ago and then developed significant nasal congestion and drainage with severe right eye swelling.  CT scan showed diffuse severe sinusitis and periorbital cellulitis. As such, the above stated procedures were offered and the patient desired strongly to proceed.  These were explained in detail.  Risks were discussed including but not limited to pain, bleeding, infection, scar formation, damage to the orbit or skull base, change in smell, nasal septal perforation, continued inflammatory disease requiring medical therapy, recurrence of symptoms and need for further procedures.  Informed surgical  consent was obtained.      DESCRIPTION OF PROCEDURE:  The patient was met in the preoperative holding   area and again the consent and history were reviewed.  She was brought back to   the operating room in good condition.  After appropriate anesthetic markers   were placed, a surgical time-out was taken and general endotracheal anesthesia   was induced.  The bed was then turned to 180 degrees.  The nasal cavity was   examined with the findings as noted above.  Lidocaine 1% with epinephrine was   infiltrated into the septum for local anesthesia and topical   epinephrine-soaked cottonoids were placed into the nose for decongestion.  The patient was then prepped and   draped in the usual sterile fashion.  The image guidance navigation system was   calibrated and noted to be functioning properly.  I first began with the septoplasty.  A 15 blade was used to make an incision along the large septal spur on the left-hand side.  The mucosal flap was then elevated in a subperichondrial and subperiosteal plane both superiorly and inferiorly.  A trans cartilaginous incision was then made and the right flap was similarly elevated.  The severe deviation of the spur was then removed and the mucosa was reapproximated.  This created a straight septum.    I then proceeded with the sinus dissection   first beginning on the left hand side.  The middle turbinate was gently   medialized.  There was a ranjan bullosa in the lateral portion of the middle turbinate was removed.  The uncinate process was teased forward and removed sharply.     A 30-degree endoscope was utilized to ensure that the natural ostium   of the maxillary sinus was incorporated into a large antrostomy and to ensure   that the uncinate was completely removed.  There was thick mucus in the sinus.  This was sampled and sent for culture.  There was severe edema in the sinus.  The ethmoids were then dissected.   The ethmoid bulla was taken down followed by partitions of  the anterior ethmoids and then the basal lamella was traversed. Partitions were removed using a combination of cutting instruments and the microdebrider. The inferior third of the superior turbinate was then resected and the natural ostium of the sphenoid sinus was identified and enlarged. The skull base and orbit were identified in the sphenoid and posterior ethmoid cells and with the assistance of image guidance, dissection continued from posterior to anterior removing all ethmoid partitions. A 30 degree endoscope was utilized to dissect along the skull base anteriorly and in the frontal recess. The location of the anterior ethmoid artery was identified and confirmed using image guidance and this was protected.  The frontal sinus ostia was then identified and it's location was confirmed using image guidance. There was significant edema in this area and the natural ostia was opened as widely as possible. A 70 degree endoscope was used to improve visualization of the frontal sinus. All bleeding was controlled using epinephrine soaked cottonoids and noted to be adequate.     I then proceeded to the right side and dissection was completed in the same fashion. The middle turbinate was medialized.  There was similarly a ranjan bullosa and again the lateral portion of this was resected.  The uncinate was   completely removed.  The 30-degree endoscope was utilized to ensure that the   natural ostium was incorporated into the large antrostomy. The anterior and posterior ethmoids were entered and dissected. The inferior third of the superior turbinate was removed and the natural ostium of the sphenoid sinus was opened widely.  There was no no decel and the true sphenoid sinus was inferior to this.  Both of these were opened.  The skull base and orbit were identified posteriorly and, respecting these landmarks, all ethmoid partitions were removed working from posterior to anterior. The image guidance navigation was utilized  to confirm landmarks, particularly along the skull base, the anterior ethmoid artery, and in the frontal sinus. The frontal sinus was then opened widely. The recess was again noted to be severely edematous. All bleeding was controlled using epinephrine soaked cottonoids and noted to be adequate.  The sinuses were then all copiously irrigated.     I then proceeded with bilateral inferior turbinate submucous resection.  A #15 blade was used to make an incision in the anterior head of the inferior turbinate bilaterally and a rosa isela elevator was used to elevate the tissue in a subperichondrial plane.  The microdebrider was then used to ablate bone and soft tissue, and a freer elevator was used to outfracture the turbinates.  Again, this created a widely patent airway.  All bleeding was controlled using epinephrine-soaked cottonoids and was noted to be adequate at the conclusion of the case. Chitogel was placed into the sinus cavities bilaterally.  The procedure was then terminated.  Care of the patient was turned back over to anesthesia for emergence and extubation.  She was taken to the PACU in stable condition.  All instrument counts were correct x2 at the completion of the case.        ____________________________________     Angelita Yu MD

## 2024-08-15 NOTE — CARE PLAN
The patient is Stable - Low risk of patient condition declining or worsening    Shift Goals  Clinical Goals: No nasal bleeding, stable VS, decrease pain  Patient Goals: Decrease pain  Family Goals: Updates    Progress made toward(s) clinical / shift goals:      Pt had 3 episodes of epistaxis overnight. Afrin administered x 3.  Hypotensive x 1 with mobility to commode. BP stable with two mobility attempts afterwards.    Problem: Pain - Standard  Goal: Alleviation of pain or a reduction in pain to the patient’s comfort goal  Outcome: Progressing  Note: Pt only required one dose of PRN pain medication at the beginning of shift and has since been pain free.     Problem: Knowledge Deficit - Standard  Goal: Patient and family/care givers will demonstrate understanding of plan of care, disease process/condition, diagnostic tests and medications  Outcome: Progressing       Patient is not progressing towards the following goals:

## 2024-08-15 NOTE — PROGRESS NOTES
Pt returned from PACU at 2030 accompanied by PACU RN. VSS.    4 Eyes Skin Assessment Completed by Eloisa, RN and Jermaine, RN.    Head Facial swelling  Ears WDL  Nose WDL  Mouth WDL  Neck WDL  Breast/Chest WDL  Shoulder Blades WDL  Spine WDL  (R) Arm/Elbow/Hand WDL  (L) Arm/Elbow/Hand WDL  Abdomen WDL  Groin WDL  Scrotum/Coccyx/Buttocks WDL  (R) Leg WDL  (L) Leg WDL  (R) Heel/Foot/Toe WDL  (L) Heel/Foot/Toe WDL          Devices In Places ECG, Blood Pressure Cuff, Pulse Ox, and SCD's      Interventions In Place TAP System, Pillows, Q2 Turns, Low Air Loss Mattress, Dri-Deejay Pads, and Heels Loaded W/Pillows    Possible Skin Injury No    Pictures Uploaded Into Epic N/A  Wound Consult Placed N/A  RN Wound Prevention Protocol Ordered No

## 2024-08-15 NOTE — CONSULTS
"INFECTIOUS DISEASES INPATIENT CONSULT NOTE     Date of Service:8/15/24    Consult Requested By: Vick Mchugh M.D.    Reason for Consultation: cavernous sinus thrombosis     History of Present Illness:   Katelynn Montanez is a 28 y.o. female  admitted 8/13/2024 due to concern for above  From admit \"28 y.o. female who was admitted on 8/13/2024 for cavernous sinus thrombosis.   Interpretor utilized during interview. 2 week ago patient developed flu like symptoms and right sided facial pain.  Reports she was seen at an outside ED and given Augmentin and Tylenol. On 8/12/24 she noted significant worsening of her facial pain, along with right face swelling. Pain goes down from forehead to neck of right side. No vision changes, but has some pain with movement of her eyes.   Denies history or family history of coagulation disorders. Denies any chronic medical conditions or medications. Denies hormonal contraceptive use, states she is not currently pregnant.   Presented to an [facility] ED, where CT maxillofacial demonstrated filling defects within the cavernous sinuses bilaterally, consistent with cavernous sinus thrombosis. Placed on heparin drip, transferred to [facility] for Neurosurgery and ENT consultation. On arrival stable vitals with temp 98.2, HR 66, RR 17, /73, 96% RA. CBC with WBC 7.7, Hgb 12.6, platelets 358. CMP unremarkable. Continued on heparin drip, Neurosurgery, Neurology, and ENT were consulted by the ED.\"  Started on vancomycin, ceftriaxone and Flagyl  Went to OR 8/14  MRI neg for CST  Infectious Diseases consulted for antibiotic recommendation and management      Review Of Systems:  Eyes movement back to baseline  Denies SE abx  All other systems are reviewed and are negative     PMH:   History reviewed. No pertinent past medical history.      PSH:  Past Surgical History:   Procedure Laterality Date    ANTROSTOMY Bilateral 8/14/2024    Procedure: BILATERAL SINUS SURGERY, TURBINATE REDUCTION WITH " STEALTH GUIDANCE, SEPTOPLASTY;  Surgeon: Angelita Yu M.D.;  Location: SURGERY [facility];  Service: Ent       FAMILY HX:  History reviewed. No pertinent family history.    SOCIAL HX:  Social History     Socioeconomic History    Marital status: Single     Spouse name: Not on file    Number of children: Not on file    Years of education: Not on file    Highest education level: Not on file   Occupational History    Not on file   Tobacco Use    Smoking status: Never    Smokeless tobacco: Never   Vaping Use    Vaping status: Never Used   Substance and Sexual Activity    Alcohol use: Not on file    Drug use: Never    Sexual activity: Not on file   Other Topics Concern    Not on file   Social History Narrative    Not on file     Social Determinants of Health     Financial Resource Strain: Not on file   Food Insecurity: Food Insecurity Present (8/14/2024)    Hunger Vital Sign     Worried About Running Out of Food in the Last Year: Sometimes true     Ran Out of Food in the Last Year: Sometimes true   Transportation Needs: No Transportation Needs (8/14/2024)    PRAPARE - Transportation     Lack of Transportation (Medical): No     Lack of Transportation (Non-Medical): No   Physical Activity: Not on file   Stress: Not on file   Social Connections: Not on file   Intimate Partner Violence: Not At Risk (8/14/2024)    Humiliation, Afraid, Rape, and Kick questionnaire     Fear of Current or Ex-Partner: No     Emotionally Abused: No     Physically Abused: No     Sexually Abused: No   Housing Stability: Unknown (8/14/2024)    Housing Stability Vital Sign     Unable to Pay for Housing in the Last Year: No     Number of Times Moved in the Last Year: Not on file     Homeless in the Last Year: Not on file     Social History     Tobacco Use   Smoking Status Never   Smokeless Tobacco Never     Social History     Substance and Sexual Activity   Alcohol Use None       Allergies/Intolerances:  No Known Allergies      Other Current  Medications:    Current Facility-Administered Medications:     enoxaparin (Lovenox) inj 40 mg, 40 mg, Subcutaneous, DAILY AT 1800, Vick Mchugh M.D.    linezolid (Zyvox) tablet 600 mg, 600 mg, Oral, Q12HRS, Lucina Honeycutt M.D., 600 mg at 08/15/24 1015    ampicillin/sulbactam (Unasyn) 3 g in  mL IVPB, 3 g, Intravenous, Q6HRS, Lucina Honeycutt M.D.    sodium chloride (Ocean) 0.65 % nasal spray 2 Spray, 2 Spray, Nasal, 4X/DAY, Angelita Yu M.D., 2 [place] at 08/15/24 1015    tobramycin-dexamethasone (Tobradex) 0.3-0.1 % ophthalmic suspension 1 Drop, 1 Drop, Right Eye, 4X/DAY, Angelita Yu M.D., 1 Drop at 08/15/24 1015    tobramycin-dexamethasone (Tobradex) ophthalmic ointment, , Both Eyes, Nightly, Angelita Yu M.D., Given at 08/14/24 2326    hydrALAZINE (Apresoline) injection 10 mg, 10 mg, Intravenous, Q4HRS PRN, Claudio Mcdonough M.D.    oxyCODONE immediate-release (Roxicodone) tablet 2.5-5 mg, 2.5-5 mg, Oral, Q4HRS PRN, Claudio Mcdonough M.D., 5 mg at 08/14/24 1103    HYDROmorphone (Dilaudid) injection 0.5 mg, 0.5 mg, Intravenous, Q3HRS PRN, Claudio Mcdonough M.D., 0.5 mg at 08/14/24 2037    acetaminophen (Tylenol) tablet 650 mg, 650 mg, Oral, Q6HRS PRN, Claudio Mcdonough M.D., 650 mg at 08/15/24 1019    senna-docusate (Pericolace Or Senokot S) 8.6-50 MG per tablet 2 Tablet, 2 Tablet, Oral, BID **AND** polyethylene glycol/lytes (Miralax) Packet 1 Packet, 1 Packet, Oral, QDAY PRN, Claudio Mcdonouhg M.D.    ondansetron (Zofran) syringe/vial injection 4 mg, 4 mg, Intravenous, Q4HRS PRN, Claudio Mcdonough M.D.    ondansetron (Zofran ODT) dispertab 4 mg, 4 mg, Oral, Q4HRS PRN, Claudio Mcdonough M.D.    promethazine (Phenergan) tablet 12.5-25 mg, 12.5-25 mg, Oral, Q4HRS PRN, Claudio Mcdonough M.D.    promethazine (Phenergan) suppository 12.5-25 mg, 12.5-25 mg, Rectal, Q4HRS PRN, Claudio Mcdonough M.D.    prochlorperazine (Compazine) injection 5-10 mg, 5-10 mg, Intravenous, Q4HRS PRN, Claudio CHRISTOPHER  "LIVIER Mcdonough      Most Recent Vital Signs:  BP 98/58   Pulse 84   Temp (P) 36.8 °C (98.2 °F) (Temporal)   Resp 18   Ht 1.651 m (5' 5\")   Wt 73.5 kg (162 lb)   SpO2 96%   BMI 26.96 kg/m²   Temp  Av.6 °C (97.8 °F)  Min: 36.1 °C (97 °F)  Max: 37.3 °C (99.1 °F)    Physical Exam:  General: well-appearing, well nourished no acute distress  HEENT: NCAT, PERRLA, sclera anicteric, mild erythema right eye and mild swelling  Neck: supple, no lymphadenopathy  Chest: CTAB, unlabored.  Cardiac: RRR,  no m/r/g   Abdomen: + bowel sounds, soft, non-tender, non-distended  Extremities: No cyanosis, clubbing. no edema, 2+ pulses  Skin: no rashes   Neuro: Alert and oriented times 3, Speech fluent CN intact WILKINSON  Psych: Mood normal Affect normal    Pertinent Lab Results:  Recent Labs     08/13/24  2204 08/14/24  0215 08/15/24  0445   WBC 7.7 6.4 10.0      Recent Labs     08/13/24  2204 08/14/24  0215 08/15/24  0445   HEMOGLOBIN 12.6 11.9* 11.8*   HEMATOCRIT 38.1 36.4* 35.5*   MCV 88.2 88.6 87.4   MCH 29.2 29.0 29.1   PLATELETCT 358 317 334         Recent Labs     08/13/24  2204 08/14/24  0215 08/15/24  0445   SODIUM 136 137 133*   POTASSIUM 4.1 3.8 4.4   CHLORIDE 104 105 105   CO2 20 20 20   CREATININE 0.52 0.47* 0.52        Recent Labs     08/13/24  2204 08/14/24  0215 08/15/24  0445   ALBUMIN 3.9 3.5 3.4        Pertinent Micro:  Results       Procedure Component Value Units Date/Time    MRSA By PCR (Amp) [[id number]]     Order Status: No result Specimen: Respirate from Nares     GRAM STAIN [[id number]] Collected: 24 5700    Order Status: Completed Specimen: Wound Updated: 08/15/24 0110     Significant Indicator .     Source WND     Site Left sinus     Gram Stain Result Few WBCs.  No organisms seen.      GRAM STAIN [[id number]] Collected: 24 4987    Order Status: Completed Specimen: Wound Updated: 08/15/24 0110     Significant Indicator .     Source WND     Site Right sinus contents     Gram Stain Result Few " WBCs.  No organisms seen.      CULTURE WOUND W/ GRAM STAIN [[id number]] Collected: 08/14/24 1738    Order Status: No result Specimen: Wound Updated: 08/15/24 0109     Significant Indicator NEG     Source WND     Site Right sinus contents     Culture Result -     Gram Stain Result Few WBCs.  No organisms seen.      Anaerobic Culture [[id number]] Collected: 08/14/24 1738    Order Status: No result Specimen: Wound Updated: 08/15/24 0109     Significant Indicator NEG     Source WND     Site Right sinus contents     Culture Result -    Fungal Culture [[id number]] Collected: 08/14/24 1738    Order Status: No result Specimen: Wound Updated: 08/15/24 0109     Significant Indicator NEG     Source WND     Site Right sinus contents     Culture Result -     Fungal Smear Results -    CULTURE WOUND W/ GRAM STAIN [[id number]] Collected: 08/14/24 1729    Order Status: No result Specimen: Wound Updated: 08/15/24 0109     Significant Indicator NEG     Source WND     Site Left sinus     Culture Result -     Gram Stain Result Few WBCs.  No organisms seen.      Anaerobic Culture [[id number]] Collected: 08/14/24 1729    Order Status: No result Specimen: Wound Updated: 08/15/24 0109     Significant Indicator NEG     Source WND     Site Left sinus     Culture Result -    Fungal Culture [[id number]] Collected: 08/14/24 1729    Order Status: Completed Specimen: Wound Updated: 08/15/24 0109     Significant Indicator NEG     Source WND     Site Left sinus     Culture Result Culture in progress.     Fungal Smear Results -    MRSA By PCR (Amp) [[id number]] Collected: 08/14/24 0020    Order Status: Completed Specimen: Respirate from Nares Updated: 08/14/24 0605     MRSA by PCR Negative    BLOOD CULTURE [[id number]] Collected: 08/14/24 0020    Order Status: Completed Specimen: Blood from Peripheral Updated: 08/14/24 0255     Significant Indicator NEG     Source BLD     Site PERIPHERAL     Culture Result No Growth  Note: Blood cultures are incubated for 5  "days and  are monitored continuously.Positive blood cultures  are called to the RN and reported as soon as  they are identified.      BLOOD CULTURE [[id number]] Collected: 08/14/24 0020    Order Status: Completed Specimen: Blood from Peripheral Updated: 08/14/24 0255     Significant Indicator NEG     Source BLD     Site PERIPHERAL     Culture Result No Growth  Note: Blood cultures are incubated for 5 days and  are monitored continuously.Positive blood cultures  are called to the RN and reported as soon as  they are identified.            No results found for: \"BLOODCULTU\", \"BLDCULT\", \"BCHOLD\"     Studies:  1.  The cavernous sinuses are unremarkable. There is no evidence of cavernous sinus enlargement or large filling defect to suggest cavernous sinus thrombosis. The superior ophthalmic veins are widely patent. There is no MR evidence of post septal   infection.  2.  There is thickening of the right periorbital fat consistent with preseptal cellulitis.  3.  There is mucosal thickening in the bilateral maxillary, ethmoidal, frontal and sphenoid sinuses consistent with pansinusitis. There is no abnormal dural enhancement to suggest any intracranial extension.  4.  Unremarkable pre and postcontrast MR examination of the brain.  IMPRESSION:   Sinusitis:   1.Severe acute maxillary sinusitis  2. Severe acute ethmoid sinusitis  3. Severe acute sphenoid sinusitis  4. Severe acute sinusitis  5. Nasal septal deviation  6. Periorbital cellulitis  S/p 8/14  1.Bilateral maxillary antrostomy.  2.  Bilateral sphenoethmoidectomy.  3. Bilateral frontal sinusotomy  4. Septoplasty   5. Bilateral Inferior turbinate submucous resection with outfracture  No cavernous sinus thrombosis   Blood cultures neg      PLAN:   FU sinus cultures  DC vanco/ceftriaxone/flagyl as no CST  Start Zyvox and Unasyn  Final antibiotic recommendations per culture results and clinical course    PICC TBD-likely no    Plan of care discussed with COURTNEY Mchugh, " M.D.. Will continue to follow    Lucina Honeycutt M.D.

## 2024-08-16 LAB
ALBUMIN SERPL BCP-MCNC: 3.5 G/DL (ref 3.2–4.9)
ALBUMIN/GLOB SERPL: 1.3 G/DL
ALP SERPL-CCNC: 68 U/L (ref 30–99)
ALT SERPL-CCNC: 9 U/L (ref 2–50)
ANION GAP SERPL CALC-SCNC: 9 MMOL/L (ref 7–16)
AST SERPL-CCNC: 12 U/L (ref 12–45)
BACTERIA WND AEROBE CULT: ABNORMAL
BASOPHILS # BLD AUTO: 0.8 % (ref 0–1.8)
BASOPHILS # BLD: 0.05 K/UL (ref 0–0.12)
BILIRUB SERPL-MCNC: <0.2 MG/DL (ref 0.1–1.5)
BUN SERPL-MCNC: 8 MG/DL (ref 8–22)
CALCIUM ALBUM COR SERPL-MCNC: 8.7 MG/DL (ref 8.5–10.5)
CALCIUM SERPL-MCNC: 8.3 MG/DL (ref 8.5–10.5)
CHLORIDE SERPL-SCNC: 107 MMOL/L (ref 96–112)
CO2 SERPL-SCNC: 22 MMOL/L (ref 20–33)
CREAT SERPL-MCNC: 0.54 MG/DL (ref 0.5–1.4)
EOSINOPHIL # BLD AUTO: 0.39 K/UL (ref 0–0.51)
EOSINOPHIL NFR BLD: 6.2 % (ref 0–6.9)
ERYTHROCYTE [DISTWIDTH] IN BLOOD BY AUTOMATED COUNT: 40.6 FL (ref 35.9–50)
GFR SERPLBLD CREATININE-BSD FMLA CKD-EPI: 128 ML/MIN/1.73 M 2
GLOBULIN SER CALC-MCNC: 2.8 G/DL (ref 1.9–3.5)
GLUCOSE SERPL-MCNC: 101 MG/DL (ref 65–99)
GRAM STN SPEC: ABNORMAL
GRAM STN SPEC: ABNORMAL
HCT VFR BLD AUTO: 34 % (ref 37–47)
HGB BLD-MCNC: 11.2 G/DL (ref 12–16)
IMM GRANULOCYTES # BLD AUTO: 0.03 K/UL (ref 0–0.11)
IMM GRANULOCYTES NFR BLD AUTO: 0.5 % (ref 0–0.9)
LACTATE SERPL-SCNC: 0.6 MMOL/L (ref 0.5–2)
LACTATE SERPL-SCNC: 0.8 MMOL/L (ref 0.5–2)
LACTATE SERPL-SCNC: 1.3 MMOL/L (ref 0.5–2)
LYMPHOCYTES # BLD AUTO: 2.47 K/UL (ref 1–4.8)
LYMPHOCYTES NFR BLD: 39 % (ref 22–41)
MAGNESIUM SERPL-MCNC: 1.8 MG/DL (ref 1.5–2.5)
MCH RBC QN AUTO: 28.8 PG (ref 27–33)
MCHC RBC AUTO-ENTMCNC: 32.9 G/DL (ref 32.2–35.5)
MCV RBC AUTO: 87.4 FL (ref 81.4–97.8)
MONOCYTES # BLD AUTO: 0.57 K/UL (ref 0–0.85)
MONOCYTES NFR BLD AUTO: 9 % (ref 0–13.4)
NEUTROPHILS # BLD AUTO: 2.83 K/UL (ref 1.82–7.42)
NEUTROPHILS NFR BLD: 44.5 % (ref 44–72)
NRBC # BLD AUTO: 0 K/UL
NRBC BLD-RTO: 0 /100 WBC (ref 0–0.2)
PHOSPHATE SERPL-MCNC: 2.6 MG/DL (ref 2.5–4.5)
PLATELET # BLD AUTO: 313 K/UL (ref 164–446)
PMV BLD AUTO: 8.7 FL (ref 9–12.9)
POTASSIUM SERPL-SCNC: 4.1 MMOL/L (ref 3.6–5.5)
PROT SERPL-MCNC: 6.3 G/DL (ref 6–8.2)
RBC # BLD AUTO: 3.89 M/UL (ref 4.2–5.4)
SIGNIFICANT IND 70042: ABNORMAL
SIGNIFICANT IND 70042: ABNORMAL
SITE SITE: ABNORMAL
SITE SITE: ABNORMAL
SODIUM SERPL-SCNC: 138 MMOL/L (ref 135–145)
SOURCE SOURCE: ABNORMAL
SOURCE SOURCE: ABNORMAL
WBC # BLD AUTO: 6.3 K/UL (ref 4.8–10.8)

## 2024-08-16 PROCEDURE — 80053 COMPREHEN METABOLIC PANEL: CPT

## 2024-08-16 PROCEDURE — 700101 HCHG RX REV CODE 250: Performed by: HOSPITALIST

## 2024-08-16 PROCEDURE — A9270 NON-COVERED ITEM OR SERVICE: HCPCS | Performed by: INTERNAL MEDICINE

## 2024-08-16 PROCEDURE — 700111 HCHG RX REV CODE 636 W/ 250 OVERRIDE (IP): Performed by: INTERNAL MEDICINE

## 2024-08-16 PROCEDURE — 8E0ZXY6 ISOLATION: ICD-10-PCS | Performed by: HOSPITALIST

## 2024-08-16 PROCEDURE — 36415 COLL VENOUS BLD VENIPUNCTURE: CPT

## 2024-08-16 PROCEDURE — 770001 HCHG ROOM/CARE - MED/SURG/GYN PRIV*

## 2024-08-16 PROCEDURE — 83605 ASSAY OF LACTIC ACID: CPT | Mod: 91

## 2024-08-16 PROCEDURE — 700111 HCHG RX REV CODE 636 W/ 250 OVERRIDE (IP): Mod: JZ | Performed by: HOSPITALIST

## 2024-08-16 PROCEDURE — 83735 ASSAY OF MAGNESIUM: CPT

## 2024-08-16 PROCEDURE — 700102 HCHG RX REV CODE 250 W/ 637 OVERRIDE(OP): Performed by: INTERNAL MEDICINE

## 2024-08-16 PROCEDURE — 700111 HCHG RX REV CODE 636 W/ 250 OVERRIDE (IP): Mod: JZ | Performed by: INTERNAL MEDICINE

## 2024-08-16 PROCEDURE — 700111 HCHG RX REV CODE 636 W/ 250 OVERRIDE (IP): Mod: JZ | Performed by: STUDENT IN AN ORGANIZED HEALTH CARE EDUCATION/TRAINING PROGRAM

## 2024-08-16 PROCEDURE — 84100 ASSAY OF PHOSPHORUS: CPT

## 2024-08-16 PROCEDURE — 700105 HCHG RX REV CODE 258: Performed by: INTERNAL MEDICINE

## 2024-08-16 PROCEDURE — 85025 COMPLETE CBC W/AUTO DIFF WBC: CPT

## 2024-08-16 PROCEDURE — 99233 SBSQ HOSP IP/OBS HIGH 50: CPT | Performed by: HOSPITALIST

## 2024-08-16 PROCEDURE — 99232 SBSQ HOSP IP/OBS MODERATE 35: CPT | Performed by: INTERNAL MEDICINE

## 2024-08-16 RX ORDER — KETOROLAC TROMETHAMINE 15 MG/ML
15 INJECTION, SOLUTION INTRAMUSCULAR; INTRAVENOUS EVERY 6 HOURS PRN
Status: DISCONTINUED | OUTPATIENT
Start: 2024-08-16 | End: 2024-08-18 | Stop reason: HOSPADM

## 2024-08-16 RX ORDER — LIDOCAINE 4 G/G
2 PATCH TOPICAL DAILY
Status: DISCONTINUED | OUTPATIENT
Start: 2024-08-16 | End: 2024-08-18 | Stop reason: HOSPADM

## 2024-08-16 RX ORDER — LIDOCAINE 4 G/G
1 PATCH TOPICAL DAILY
Status: DISCONTINUED | OUTPATIENT
Start: 2024-08-16 | End: 2024-08-16

## 2024-08-16 RX ADMIN — LINEZOLID 600 MG: 600 TABLET, FILM COATED ORAL at 05:40

## 2024-08-16 RX ADMIN — OXYCODONE HYDROCHLORIDE 5 MG: 5 TABLET ORAL at 10:45

## 2024-08-16 RX ADMIN — LIDOCAINE 1 PATCH: 4 PATCH TOPICAL at 16:53

## 2024-08-16 RX ADMIN — OXYCODONE HYDROCHLORIDE 5 MG: 5 TABLET ORAL at 04:05

## 2024-08-16 RX ADMIN — AMOXICILLIN AND CLAVULANATE POTASSIUM 1 TABLET: 875; 125 TABLET, FILM COATED ORAL at 10:46

## 2024-08-16 RX ADMIN — TOBRAMYCIN AND DEXAMETHASONE 1 DROP: 1; 3 SUSPENSION/ DROPS OPHTHALMIC at 10:46

## 2024-08-16 RX ADMIN — ACETAMINOPHEN 650 MG: 325 TABLET ORAL at 22:57

## 2024-08-16 RX ADMIN — AMPICILLIN SODIUM, SULBACTAM SODIUM 3 G: 2; 1 INJECTION, POWDER, FOR SOLUTION INTRAMUSCULAR; INTRAVENOUS at 05:41

## 2024-08-16 RX ADMIN — Medication 2 SPRAY: at 16:53

## 2024-08-16 RX ADMIN — ENOXAPARIN SODIUM 40 MG: 100 INJECTION SUBCUTANEOUS at 18:44

## 2024-08-16 RX ADMIN — AMOXICILLIN AND CLAVULANATE POTASSIUM 1 TABLET: 875; 125 TABLET, FILM COATED ORAL at 20:36

## 2024-08-16 RX ADMIN — AMPICILLIN SODIUM, SULBACTAM SODIUM 3 G: 2; 1 INJECTION, POWDER, FOR SOLUTION INTRAMUSCULAR; INTRAVENOUS at 00:32

## 2024-08-16 RX ADMIN — SENNOSIDES AND DOCUSATE SODIUM 2 TABLET: 50; 8.6 TABLET ORAL at 18:44

## 2024-08-16 RX ADMIN — KETOROLAC TROMETHAMINE 15 MG: 15 INJECTION, SOLUTION INTRAMUSCULAR; INTRAVENOUS at 12:52

## 2024-08-16 RX ADMIN — HYDROMORPHONE HYDROCHLORIDE 0.5 MG: 1 INJECTION, SOLUTION INTRAMUSCULAR; INTRAVENOUS; SUBCUTANEOUS at 11:51

## 2024-08-16 RX ADMIN — Medication 2 SPRAY: at 10:45

## 2024-08-16 RX ADMIN — OXYCODONE HYDROCHLORIDE 5 MG: 5 TABLET ORAL at 16:52

## 2024-08-16 RX ADMIN — LIDOCAINE 1 PATCH: 4 PATCH TOPICAL at 12:53

## 2024-08-16 RX ADMIN — LINEZOLID 600 MG: 600 TABLET, FILM COATED ORAL at 18:43

## 2024-08-16 RX ADMIN — KETOROLAC TROMETHAMINE 15 MG: 15 INJECTION, SOLUTION INTRAMUSCULAR; INTRAVENOUS at 18:46

## 2024-08-16 ASSESSMENT — ENCOUNTER SYMPTOMS
PALPITATIONS: 0
WEAKNESS: 0
EYES NEGATIVE: 1
FEVER: 0
SORE THROAT: 0
GASTROINTESTINAL NEGATIVE: 1
BRUISES/BLEEDS EASILY: 0
SHORTNESS OF BREATH: 0
BLURRED VISION: 0
DIAPHORESIS: 0
NAUSEA: 0
COUGH: 0
SINUS PAIN: 1
WEIGHT LOSS: 0
PSYCHIATRIC NEGATIVE: 1
MYALGIAS: 1
RESPIRATORY NEGATIVE: 1
DEPRESSION: 0
HEADACHES: 0
CARDIOVASCULAR NEGATIVE: 1
BACK PAIN: 1
ABDOMINAL PAIN: 0
CHILLS: 0
DIZZINESS: 1
FOCAL WEAKNESS: 0
VOMITING: 0

## 2024-08-16 ASSESSMENT — PAIN DESCRIPTION - PAIN TYPE
TYPE: ACUTE PAIN

## 2024-08-16 ASSESSMENT — LIFESTYLE VARIABLES: SUBSTANCE_ABUSE: 0

## 2024-08-16 NOTE — CONSULTS
Hospital Medicine Consultation    Date of Service  8/15/2024    Referring Physician  Vick Mchugh M.D.     Consulting Physician  Sushila Pickard M.D.    Reason for Consultation  Transfer of care    History of Presenting Illness    28 y.o. female who presented 8/13/2024 with right-sided facial pain.  There was a concern that patient has cavernous sinus thrombosis and neurology was consulted and she was started on heparin infusion.  Later she underwent MRI that did not show cavernous sinus thrombosis and anticoagulation was discontinued and neurology signed off.  Patient found to have severe acute bacterial sinusitis, acute ethmoid sinusitis, acute sphenoid sinusitis, nasal septal deviation and she underwent bilateral maxillary antrostomy, bilateral sphenoethmoidectomy and bilateral frontal sinusotomy by ENT surgery Dr. Yu.    On August 15, 2024 intensivist Dr. Mchugh requested to transfer care to hospitalist service.    I evaluated and examined her at the bedside.  I used video  to discuss plan of care with patient.  She reported she is feeling mild throat pain but now it has resolved and she is feeling better.  Infectious disease has been following her.  I discussed plan of care with the regarding continuation of antibiotic.  ENT surgery is following her as well.    Review of Systems  Review of Systems   Constitutional:  Negative for chills, fever and weight loss.   HENT:  Negative for hearing loss and tinnitus.         Mild throat pain   Eyes:  Negative for blurred vision, double vision, photophobia and pain.   Respiratory:  Negative for cough, sputum production and shortness of breath.    Cardiovascular:  Negative for chest pain, palpitations, orthopnea and leg swelling.   Gastrointestinal:  Negative for abdominal pain, constipation, diarrhea, nausea and vomiting.   Genitourinary:  Negative for dysuria, frequency and urgency.   Musculoskeletal:  Negative for back pain, joint pain,  myalgias and neck pain.   Skin:  Negative for rash.   Neurological:  Negative for dizziness, tingling, tremors, sensory change, speech change, focal weakness and headaches.   Psychiatric/Behavioral:  Negative for hallucinations and substance abuse.    All other systems reviewed and are negative.      Past Medical History   has no past medical history on file.    Surgical History   has a past surgical history that includes antrostomy (Bilateral, 8/14/2024).    Family History  family history is not on file.    Social History   reports that she has never smoked. She has never used smokeless tobacco. She reports that she does not use drugs.    Medications  None       Allergies  No Known Allergies    Physical Exam  Temp:  [36.2 °C (97.1 °F)-37.3 °C (99.1 °F)] 36.7 °C (98 °F)  Pulse:  [63-90] 63  Resp:  [12-36] 16  BP: ()/(50-70) 93/50  SpO2:  [94 %-100 %] 98 %    Physical Exam  Vitals reviewed.   Constitutional:       General: She is not in acute distress.     Appearance: Normal appearance. She is not ill-appearing.   HENT:      Head: Normocephalic and atraumatic.      Nose: No congestion.   Eyes:      General:         Right eye: No discharge.         Left eye: No discharge.      Pupils: Pupils are equal, round, and reactive to light.   Cardiovascular:      Rate and Rhythm: Normal rate and regular rhythm.      Pulses: Normal pulses.      Heart sounds: Normal heart sounds. No murmur heard.  Pulmonary:      Effort: Pulmonary effort is normal. No respiratory distress.      Breath sounds: Normal breath sounds. No stridor.   Abdominal:      General: Bowel sounds are normal. There is no distension.      Palpations: Abdomen is soft.      Tenderness: There is no abdominal tenderness.   Musculoskeletal:         General: No swelling or tenderness. Normal range of motion.      Cervical back: Normal range of motion. No rigidity.   Skin:     General: Skin is warm.      Capillary Refill: Capillary refill takes less than 2  seconds.      Coloration: Skin is not jaundiced or pale.      Findings: No bruising.   Neurological:      General: No focal deficit present.      Mental Status: She is alert and oriented to person, place, and time.      Cranial Nerves: No cranial nerve deficit.      Comments: She is following commands and moving all her extremities   Psychiatric:         Mood and Affect: Mood normal.         Behavior: Behavior normal.         Fluids  Date 08/15/24 0700 - 08/16/24 0659   Shift 6469-1686 9099-7080 7162-0705 24 Hour Total   INTAKE   Shift Total       OUTPUT   Urine 1000   1000   Shift Total 1000   1000   Weight (kg) 73.5 73.5 73.5 73.5       Laboratory  Recent Labs     08/13/24 2204 08/14/24 0215 08/15/24  0445   WBC 7.7 6.4 10.0   RBC 4.32 4.11* 4.06*   HEMOGLOBIN 12.6 11.9* 11.8*   HEMATOCRIT 38.1 36.4* 35.5*   MCV 88.2 88.6 87.4   MCH 29.2 29.0 29.1   MCHC 33.1 32.7 33.2   RDW 41.4 41.5 39.9   PLATELETCT 358 317 334   MPV 8.8* 8.7* 8.7*     Recent Labs     08/13/24 2204 08/14/24 0215 08/15/24  0445   SODIUM 136 137 133*   POTASSIUM 4.1 3.8 4.4   CHLORIDE 104 105 105   CO2 20 20 20   GLUCOSE 108* 93 117*   BUN 10 8 4*   CREATININE 0.52 0.47* 0.52   CALCIUM 8.8 8.3* 7.9*     Recent Labs     08/14/24  0215   INR 1.13                 Imaging  MR-ORBITS,FACE,NECK-WITH&W/O & SEQUENCES   Final Result      1.  The cavernous sinuses are unremarkable. There is no evidence of cavernous sinus enlargement or large filling defect to suggest cavernous sinus thrombosis. The superior ophthalmic veins are widely patent. There is no MR evidence of post septal    infection.   2.  There is thickening of the right periorbital fat consistent with preseptal cellulitis.   3.  There is mucosal thickening in the bilateral maxillary, ethmoidal, frontal and sphenoid sinuses consistent with pansinusitis. There is no abnormal dural enhancement to suggest any intracranial extension.      MR-BRAIN-WITH & W/O   Final Result      1.  The  cavernous sinuses are unremarkable. There is no evidence of cavernous sinus enlargement or large filling defect to suggest cavernous sinus thrombosis. The superior ophthalmic veins are widely patent. There is no MR evidence of post septal    infection.   2.  There is thickening of the right periorbital fat consistent with preseptal cellulitis.   3.  There is mucosal thickening in the bilateral maxillary, ethmoidal, frontal and sphenoid sinuses consistent with pansinusitis. There is no abnormal dural enhancement to suggest any intracranial extension.   4.  Unremarkable pre and postcontrast MR examination of the brain.      CT-STEALTH HEAD W/O   Final Result      1. Chronic pansinus disease, sparing the right sphenoid sinus.   2. Right preseptal periorbital soft tissue swelling.   3. The remainder of the CT of the head without IV contrast is within normal limits.                   Assessment/Plan  * Cavernous sinus thrombosis- (present on admission)  Assessment & Plan  Ruled out on MRI  She does have extensive sinusitis causing preseptal cellulitis on the right eye  S/p OR with ENT for washout    Infectious disease consulted for antibiotic recommendations  She is overall doing well and now plan is to transfer out from ICU.    Periorbital cellulitis of right eye- (present on admission)  Assessment & Plan  Infectious disease has been following her.  Continue IV Unasyn and p.o. Zyvox.      Acute sinusitis- (present on admission)  Assessment & Plan  Seen on CT. Patient reports she was on Augmentin outpatient   Antibiotics and cultures as above    MRI without evidence of cavernous sinus thrombosis  S/p OR with ENT for washout of sinuses    I reviewed infectious disease and ENT note.  I reviewed OP note.  I discussed plan of care with patient and answered all her questions.          Thank you for allow me to participate in patient care.  Hospitalist service will continue to follow this patient.    I reviewed and summarized  patient hospitalization in this document.    I discussed plan of care with bedside RN.    I personally discussed plan of care with intensivist Dr. Mchugh

## 2024-08-16 NOTE — CARE PLAN
The patient is Stable - Low risk of patient condition declining or worsening    Shift Goals  Clinical Goals: No nasal bleeding, stable VS, decrease pain  Patient Goals: Decrease pain  Family Goals: Updates    Progress made toward(s) clinical / shift goals:      Problem: Pain - Standard  Goal: Alleviation of pain or a reduction in pain to the patient’s comfort goal  Description: Target End Date:  Prior to discharge or change in level of care    Document on Vitals flowsheet    1.  Document pain using the appropriate pain scale per order or unit policy  2.  Educate and implement non-pharmacologic comfort measures (i.e. relaxation, distraction, massage, cold/heat therapy, etc.)  3.  Pain management medications as ordered  4.  Reassess pain after pain med administration per policy  5.  If opiods administered assess patient's response to pain medication is appropriate per POSS sedation scale  6.  Follow pain management plan developed in collaboration with patient and interdisciplinary team (including palliative care or pain specialists if applicable)  Outcome: Progressing     Problem: Knowledge Deficit - Standard  Goal: Patient and family/care givers will demonstrate understanding of plan of care, disease process/condition, diagnostic tests and medications  Description: Target End Date:  1-3 days or as soon as patient condition allows    Document in Patient Education    1.  Patient and family/caregiver oriented to unit, equipment, visitation policy and means for communicating concern  2.  Complete/review Learning Assessment  3.  Assess knowledge level of disease process/condition, treatment plan, diagnostic tests and medications  4.  Explain disease process/condition, treatment plan, diagnostic tests and medications  Outcome: Progressing     Problem: Provide Safe Environment  Goal: Suicide environmental safety, protocols, policies, and practices will be implemented  Description: Target End Date:  resolve day 1    1.   Remove objects or personal belongings that may cause harm or injury to self or others  2.  Dietary tray modifications (paperware)  3.  Provide a safe environment  4.  Render close patient supervision by sustaining observation or awareness of the patient at all times  Outcome: Progressing     Problem: Psychosocial  Goal: Patient's ability to identify and develop effective coping behaviors will improve  Description: Target End Date:  1 to 3 days    1.  Present opportunities for the patient to express thoughts, and feelings in a nonjudgmental environment  2.  Help the patient with problem-solving in a constructive manner.  3.  Educate the patient on cognitive-behavioral self-management responses to suicidal thoughts.  4.  Introduce the use of self-expression methods to manage suicidal feelings  5.  Provide emotional support  6.  Encourage identification of positive aspects of self  Outcome: Progressing       Patient is not progressing towards the following goals:

## 2024-08-16 NOTE — ASSESSMENT & PLAN NOTE
Seen on CT. Reportedly improving, diffuse sinus pain  Nares + MRSA  See above, awaiting ID input regarding new culture results  Improving on current oral regimen  Much improved, hopefully home soon with outpatient ent follow up   isolation    MRI without evidence of cavernous sinus thrombosis  S/p OR with ENT for washout of sinuses  ENT will follow up outpatient  Supportive care

## 2024-08-16 NOTE — PROGRESS NOTES
"S: NAEON. Pain well controlled. Some congestion and mouth was dry overnight. Scant bloody drainage last night, but improved. Eye feels better    O:  /71   Pulse 68   Temp 37.2 °C (99 °F) (Temporal)   Resp 17   Ht 1.651 m (5' 5\")   Wt 73.5 kg (162 lb)   SpO2 98%   Gen: interactive and appropriate, Danish speaking  Pulm: Breathing comfortably on RA without stridor or stertor  Face/eyes: right periorbital edema much improved/essentially resolved, chemosis resolved. Mild conjunctival injection continues to improve. Extraocular movements entirely intact. Vision grossly intact bilaterally  Nose: patent, minimal drainage    Neck: flat, no discrete masses  Neuro: cranial nerves grossly intact bilaterally. Mood appropriate      Recent Labs     08/14/24  0215 08/15/24  0445 08/16/24  0441   WBC 6.4 10.0 6.3   RBC 4.11* 4.06* 3.89*   HEMOGLOBIN 11.9* 11.8* 11.2*   HEMATOCRIT 36.4* 35.5* 34.0*   MCV 88.6 87.4 87.4   MCH 29.0 29.1 28.8   MCHC 32.7 33.2 32.9   RDW 41.5 39.9 40.6   PLATELETCT 317 334 313   MPV 8.7* 8.7* 8.7*     Recent Labs     08/14/24  0215 08/15/24  0445 08/16/24  0441   SODIUM 137 133* 138   POTASSIUM 3.8 4.4 4.1   CHLORIDE 105 105 107   CO2 20 20 22   GLUCOSE 93 117* 101*   BUN 8 4* 8   CREATININE 0.47* 0.52 0.54   CALCIUM 8.3* 7.9* 8.3*     Recent Labs     08/14/24 0215   INR 1.13         A/P:Katelynn Montanez is a 28 y.o. female with sinusitis, periorbital cellulitis, now s/p bilateral sinus surgery 8/14. No cavernous sinus thrombosis on more detailed imaging. Doing well. Pain well controlled, expected nasal congestion. Cultures pending - staph aureus. Eye continues to improve. Antibiotics per ID, appreciate assistance. Recommend saline spray multiple times per day, already ordered. Ok to use saline rinses as well. Will need follow up with me in 1-2 weeks. My office will call her to schedule. Ok for discharge from ENT standpoint, will signoff.      Please call with questions or concerns " or any clinical changes.     Angelita Yu M.D.  [phone]

## 2024-08-16 NOTE — ASSESSMENT & PLAN NOTE
Ruled out on MRI  She does have extensive sinusitis causing preseptal cellulitis on the right eye  S/p OR with ENT for washout  See above

## 2024-08-16 NOTE — PROGRESS NOTES
[facility] Medicine Daily Progress Note    Date of Service  8/16/2024    Chief Complaint  Katelynn Montanez is a 28 y.o. female admitted 8/13/2024 with facial pain    Hospital Course  Patient is a 28 year old female who presented to [facility] on 8/13/2024 with right-sided facial pain.  There was a concern that patient has cavernous sinus thrombosis and neurology was consulted and she was started on heparin infusion.  Later she underwent MRI that did not show cavernous sinus thrombosis and anticoagulation was discontinued and neurology signed off.  Patient was found to have severe acute bacterial sinusitis, acute ethmoid sinusitis, acute sphenoid sinusitis, nasal septal deviation and she underwent bilateral maxillary antrostomy, bilateral sphenoethmoidectomy and bilateral frontal sinusotomy by ENT surgery Dr. Yu.       Interval Problem Update  Axox3, she initially reported severe diffuse facial and sinus pain, radiating to both ears 9/10 - much improved after pain meds, now 4/10. She denies visual changes, reports she does not have an appetite for solid food, requested clears, denies nausea, no abdominal pain, she does reports mild dizziness - states she has had it for several days and it is improving, no headache. Denies sob. ROS otherwise negative. Severe pain was concerning, had associated diaphoresis, serial vitals, lactic (wnl) with addition of toradol now comfortable, breathing normal, high risk for meningitis and deterioration, following closely     I have discussed this patient's plan of care and discharge plan at IDT rounds today with Case Management, Nursing, Nursing leadership, and other members of the IDT team.    Consultants/Specialty  ENT   ID    Code Status  Full Code    Disposition  The patient is not medically cleared for discharge to home or a post-acute facility.      I have placed the appropriate orders for post-discharge needs.    Review of Systems  Review of Systems   Constitutional:   Positive for malaise/fatigue. Negative for chills, diaphoresis, fever and weight loss.   HENT:  Positive for congestion, ear pain and sinus pain. Negative for sore throat.    Eyes: Negative.  Negative for blurred vision.   Respiratory: Negative.  Negative for cough and shortness of breath.    Cardiovascular: Negative.  Negative for chest pain, palpitations and leg swelling.   Gastrointestinal: Negative.  Negative for abdominal pain, nausea and vomiting.   Genitourinary: Negative.  Negative for dysuria.   Musculoskeletal:  Positive for back pain (upper back and right shoulder) and myalgias.   Skin: Negative.  Negative for itching and rash.   Neurological:  Positive for dizziness. Negative for focal weakness, weakness and headaches.   Endo/Heme/Allergies: Negative.  Does not bruise/bleed easily.   Psychiatric/Behavioral: Negative.  Negative for depression, substance abuse and suicidal ideas.    All other systems reviewed and are negative.       Physical Exam  Temp:  [37.2 °C (99 °F)-37.7 °C (99.8 °F)] 37.3 °C (99.1 °F)  Pulse:  [63-78] 71  Resp:  [16-17] 16  BP: (107-121)/(65-80) 121/80  SpO2:  [97 %-99 %] 97 %    Physical Exam  Vitals and nursing note reviewed. Exam conducted with a chaperone present.   Constitutional:       General: She is not in acute distress.     Appearance: Normal appearance. She is not diaphoretic.   HENT:      Head: Normocephalic.      Comments: Diffuse sinus pressure/pain to palpation     Right Ear: Tympanic membrane, ear canal and external ear normal.      Left Ear: Tympanic membrane, ear canal and external ear normal.      Nose: Congestion present.      Mouth/Throat:      Mouth: Mucous membranes are moist.   Eyes:      General: No scleral icterus.        Right eye: No discharge.         Left eye: No discharge.      Extraocular Movements: Extraocular movements intact.      Conjunctiva/sclera: Conjunctivae normal.      Pupils: Pupils are equal, round, and reactive to light.   Cardiovascular:       Rate and Rhythm: Normal rate and regular rhythm.      Pulses: Normal pulses.      Heart sounds: Normal heart sounds.   Pulmonary:      Effort: Pulmonary effort is normal.      Breath sounds: Normal breath sounds.   Abdominal:      General: Abdomen is flat. Bowel sounds are normal.      Palpations: Abdomen is soft.   Musculoskeletal:         General: No swelling or deformity. Normal range of motion.   Skin:     General: Skin is warm and dry.      Capillary Refill: Capillary refill takes less than 2 seconds.   Neurological:      General: No focal deficit present.      Mental Status: She is alert and oriented to person, place, and time.      Cranial Nerves: No cranial nerve deficit.   Psychiatric:         Mood and Affect: Mood normal.         Behavior: Behavior normal.         Fluids    Intake/Output Summary (Last 24 hours) at 8/16/2024 1607  Last data filed at 8/16/2024 0930  Gross per 24 hour   Intake 480 ml   Output --   Net 480 ml       Laboratory  Recent Labs     08/14/24  0215 08/15/24  0445 08/16/24  0441   WBC 6.4 10.0 6.3   RBC 4.11* 4.06* 3.89*   HEMOGLOBIN 11.9* 11.8* 11.2*   HEMATOCRIT 36.4* 35.5* 34.0*   MCV 88.6 87.4 87.4   MCH 29.0 29.1 28.8   MCHC 32.7 33.2 32.9   RDW 41.5 39.9 40.6   PLATELETCT 317 334 313   MPV 8.7* 8.7* 8.7*     Recent Labs     08/14/24 0215 08/15/24  0445 08/16/24  0441   SODIUM 137 133* 138   POTASSIUM 3.8 4.4 4.1   CHLORIDE 105 105 107   CO2 20 20 22   GLUCOSE 93 117* 101*   BUN 8 4* 8   CREATININE 0.47* 0.52 0.54   CALCIUM 8.3* 7.9* 8.3*     Recent Labs     08/14/24 0215   INR 1.13               Imaging  MR-ORBITS,FACE,NECK-WITH&W/O & SEQUENCES   Final Result      1.  The cavernous sinuses are unremarkable. There is no evidence of cavernous sinus enlargement or large filling defect to suggest cavernous sinus thrombosis. The superior ophthalmic veins are widely patent. There is no MR evidence of post septal    infection.   2.  There is thickening of the right periorbital  fat consistent with preseptal cellulitis.   3.  There is mucosal thickening in the bilateral maxillary, ethmoidal, frontal and sphenoid sinuses consistent with pansinusitis. There is no abnormal dural enhancement to suggest any intracranial extension.      MR-BRAIN-WITH & W/O   Final Result      1.  The cavernous sinuses are unremarkable. There is no evidence of cavernous sinus enlargement or large filling defect to suggest cavernous sinus thrombosis. The superior ophthalmic veins are widely patent. There is no MR evidence of post septal    infection.   2.  There is thickening of the right periorbital fat consistent with preseptal cellulitis.   3.  There is mucosal thickening in the bilateral maxillary, ethmoidal, frontal and sphenoid sinuses consistent with pansinusitis. There is no abnormal dural enhancement to suggest any intracranial extension.   4.  Unremarkable pre and postcontrast MR examination of the brain.      CT-STEALTH HEAD W/O   Final Result      1. Chronic pansinus disease, sparing the right sphenoid sinus.   2. Right preseptal periorbital soft tissue swelling.   3. The remainder of the CT of the head without IV contrast is within normal limits.                    Assessment/Plan  * Cavernous sinus thrombosis- (present on admission)  Assessment & Plan  Ruled out on MRI  She does have extensive sinusitis causing preseptal cellulitis on the right eye  S/p OR with ENT for washout  See above    Periorbital cellulitis of right eye- (present on admission)  Assessment & Plan  Infectious disease has been following her.  Continue IV Unasyn and p.o. Zyvox.      Acute sinusitis- (present on admission)  Assessment & Plan  Seen on CT. Reportedly improving, diffuse sinus pain  Nares + MRSA  Discussed with ID, continue zyvox and unasyn  isolation    MRI without evidence of cavernous sinus thrombosis  S/p OR with ENT for washout of sinuses  ENT continues to follow  Supportive care           VTE prophylaxis:  lovenox    I have performed a physical exam and reviewed and updated ROS and Plan today (8/16/2024). In review of yesterday's note (8/15/2024), there are no changes except as documented above.

## 2024-08-16 NOTE — PROGRESS NOTES
4 Eyes Skin Assessment Completed by LISBET Haddad and LISBET Rueda.    Head WDL  Ears WDL  Nose WDL  Mouth WDL  Neck WDL  Breast/Chest WDL  Shoulder Blades WDL  Spine WDL  (R) Arm/Elbow/Hand WDL  (L) Arm/Elbow/Hand WDL  Abdomen WDL  Groin WDL  Scrotum/Coccyx/Buttocks WDL  (R) Leg WDL  (L) Leg WDL  (R) Heel/Foot/Toe WDL  (L) Heel/Foot/Toe WDL                        Devices In Places PIV      Interventions In Place N/A    Possible Skin Injury No    Pictures Uploaded Into Epic Yes  Wound Consult Placed N/A  RN Wound Prevention Protocol Ordered No

## 2024-08-16 NOTE — PROGRESS NOTES
ID brief note:  Right periorbital edema essentially resolved, chemosis resolved.   Culture +MRSA  Continue Zyvox 600 mg PO BID for 10 days   Transition Unasyn to Augmentin for 10 more days for pansinusitis  FU ENT as scheduled  FU ID prn

## 2024-08-16 NOTE — CARE PLAN
The patient is Watcher - Medium risk of patient condition declining or worsening    Progress made toward(s) clinical / shift goals:    Problem: Pain - Standard  Goal: Alleviation of pain or a reduction in pain to the patient’s comfort goal  Outcome: Progressing     Problem: Knowledge Deficit - Standard  Goal: Patient and family/care givers will demonstrate understanding of plan of care, disease process/condition, diagnostic tests and medications  Outcome: Progressing     Problem: Psychosocial  Goal: Patient's ability to identify and develop effective coping behaviors will improve  Outcome: Progressing       Patient is not progressing towards the following goals:

## 2024-08-16 NOTE — CARE PLAN
Pt AO x 4.  Pt c/o pain in head, medication intervention given per MAR.  Call light and belongings within reach.  Bed locked and in lowest position.  Hourly rounding.  Needs currently met.       The patient is Stable - Low risk of patient condition declining or worsening    Shift Goals  Clinical Goals: pain control, rest, and decrease nose bleeds  Patient Goals: pain control, rest  Family Goals: updates    Progress made toward(s) clinical / shift goals:      Problem: Pain - Standard  Goal: Alleviation of pain or a reduction in pain to the patient’s comfort goal  Outcome: Progressing     Problem: Knowledge Deficit - Standard  Goal: Patient and family/care givers will demonstrate understanding of plan of care, disease process/condition, diagnostic tests and medications  Outcome: Progressing     Problem: Provide Safe Environment  Goal: Suicide environmental safety, protocols, policies, and practices will be implemented  Outcome: Progressing     Problem: Psychosocial  Goal: Patient's ability to identify and develop effective coping behaviors will improve  Outcome: Progressing       Patient is not progressing towards the following goals:

## 2024-08-17 LAB
BACTERIA SPEC ANAEROBE CULT: NORMAL
LACTATE SERPL-SCNC: 0.6 MMOL/L (ref 0.5–2)
SIGNIFICANT IND 70042: NORMAL
SITE SITE: NORMAL
SOURCE SOURCE: NORMAL

## 2024-08-17 PROCEDURE — 36415 COLL VENOUS BLD VENIPUNCTURE: CPT

## 2024-08-17 PROCEDURE — A9270 NON-COVERED ITEM OR SERVICE: HCPCS | Performed by: INTERNAL MEDICINE

## 2024-08-17 PROCEDURE — 99232 SBSQ HOSP IP/OBS MODERATE 35: CPT | Performed by: HOSPITALIST

## 2024-08-17 PROCEDURE — 700102 HCHG RX REV CODE 250 W/ 637 OVERRIDE(OP): Performed by: INTERNAL MEDICINE

## 2024-08-17 PROCEDURE — 700111 HCHG RX REV CODE 636 W/ 250 OVERRIDE (IP): Mod: JZ | Performed by: HOSPITALIST

## 2024-08-17 PROCEDURE — 700101 HCHG RX REV CODE 250: Performed by: HOSPITALIST

## 2024-08-17 PROCEDURE — 700111 HCHG RX REV CODE 636 W/ 250 OVERRIDE (IP): Mod: JZ | Performed by: STUDENT IN AN ORGANIZED HEALTH CARE EDUCATION/TRAINING PROGRAM

## 2024-08-17 PROCEDURE — 83605 ASSAY OF LACTIC ACID: CPT

## 2024-08-17 PROCEDURE — 770001 HCHG ROOM/CARE - MED/SURG/GYN PRIV*

## 2024-08-17 RX ADMIN — OXYCODONE HYDROCHLORIDE 5 MG: 5 TABLET ORAL at 21:37

## 2024-08-17 RX ADMIN — TOBRAMYCIN AND DEXAMETHASONE 1 DROP: 1; 3 SUSPENSION/ DROPS OPHTHALMIC at 19:27

## 2024-08-17 RX ADMIN — TOBRAMYCIN AND DEXAMETHASONE 1 DROP: 1; 3 SUSPENSION/ DROPS OPHTHALMIC at 09:41

## 2024-08-17 RX ADMIN — TOBRAMYCIN AND DEXAMETHASONE: 3; 1 OINTMENT OPHTHALMIC at 21:33

## 2024-08-17 RX ADMIN — SENNOSIDES AND DOCUSATE SODIUM 2 TABLET: 50; 8.6 TABLET ORAL at 04:07

## 2024-08-17 RX ADMIN — Medication 2 SPRAY: at 21:33

## 2024-08-17 RX ADMIN — SENNOSIDES AND DOCUSATE SODIUM 2 TABLET: 50; 8.6 TABLET ORAL at 19:24

## 2024-08-17 RX ADMIN — Medication 2 SPRAY: at 13:42

## 2024-08-17 RX ADMIN — OXYCODONE HYDROCHLORIDE 5 MG: 5 TABLET ORAL at 09:44

## 2024-08-17 RX ADMIN — AMOXICILLIN AND CLAVULANATE POTASSIUM 1 TABLET: 875; 125 TABLET, FILM COATED ORAL at 21:34

## 2024-08-17 RX ADMIN — OXYCODONE HYDROCHLORIDE 5 MG: 5 TABLET ORAL at 04:06

## 2024-08-17 RX ADMIN — Medication 2 SPRAY: at 09:40

## 2024-08-17 RX ADMIN — ENOXAPARIN SODIUM 40 MG: 100 INJECTION SUBCUTANEOUS at 19:24

## 2024-08-17 RX ADMIN — TOBRAMYCIN AND DEXAMETHASONE 1 DROP: 1; 3 SUSPENSION/ DROPS OPHTHALMIC at 13:44

## 2024-08-17 RX ADMIN — KETOROLAC TROMETHAMINE 15 MG: 15 INJECTION, SOLUTION INTRAMUSCULAR; INTRAVENOUS at 13:41

## 2024-08-17 RX ADMIN — LIDOCAINE 2 PATCH: 4 PATCH TOPICAL at 04:07

## 2024-08-17 RX ADMIN — OXYCODONE HYDROCHLORIDE 5 MG: 5 TABLET ORAL at 13:40

## 2024-08-17 RX ADMIN — Medication 2 SPRAY: at 19:27

## 2024-08-17 RX ADMIN — TOBRAMYCIN AND DEXAMETHASONE 1 DROP: 1; 3 SUSPENSION/ DROPS OPHTHALMIC at 21:33

## 2024-08-17 RX ADMIN — ACETAMINOPHEN 650 MG: 325 TABLET ORAL at 13:40

## 2024-08-17 RX ADMIN — AMOXICILLIN AND CLAVULANATE POTASSIUM 1 TABLET: 875; 125 TABLET, FILM COATED ORAL at 09:40

## 2024-08-17 RX ADMIN — LINEZOLID 600 MG: 600 TABLET, FILM COATED ORAL at 19:24

## 2024-08-17 RX ADMIN — LINEZOLID 600 MG: 600 TABLET, FILM COATED ORAL at 04:06

## 2024-08-17 ASSESSMENT — ENCOUNTER SYMPTOMS
MYALGIAS: 1
DEPRESSION: 0
BRUISES/BLEEDS EASILY: 0
RESPIRATORY NEGATIVE: 1
DIAPHORESIS: 0
SHORTNESS OF BREATH: 0
CARDIOVASCULAR NEGATIVE: 1
VOMITING: 0
PALPITATIONS: 0
BACK PAIN: 1
GASTROINTESTINAL NEGATIVE: 1
PSYCHIATRIC NEGATIVE: 1
NAUSEA: 0
ABDOMINAL PAIN: 0
SORE THROAT: 0
BLURRED VISION: 0
COUGH: 0
DIZZINESS: 1
FOCAL WEAKNESS: 0
FEVER: 0
WEAKNESS: 0
EYES NEGATIVE: 1
WEIGHT LOSS: 0
HEADACHES: 0
SINUS PAIN: 1
CHILLS: 0

## 2024-08-17 ASSESSMENT — PAIN DESCRIPTION - PAIN TYPE
TYPE: ACUTE PAIN
TYPE: ACUTE PAIN

## 2024-08-17 ASSESSMENT — LIFESTYLE VARIABLES: SUBSTANCE_ABUSE: 0

## 2024-08-17 ASSESSMENT — FIBROSIS 4 INDEX: FIB4 SCORE: 0.36

## 2024-08-17 NOTE — PROGRESS NOTES
Received bedside report and accepted care of patient.  Patient currently resting in bed in no visible or stated distress.  Bed controls on and bed in locked position.  Bed alarm on.  Contact and droplet isolation in place. Call light and personal possessions within reach.  Plan of care to include pain management, antibiotic therapy, assistance with ADL's and continued discharge planning.  Patient verbalizes agreement with plan of care, and has no additional questions or concerns at this time.  Will continue to update notes/plan of care as needed throughout shift.

## 2024-08-17 NOTE — CARE PLAN
The patient is Stable - Low risk of patient condition declining or worsening    Shift Goals  Clinical Goals: safety and comfort, po ABX, pain control  Patient Goals: rest  Family Goals: n/a    Progress made toward(s) clinical / shift goals:    Problem: Pain - Standard  Goal: Alleviation of pain or a reduction in pain to the patient’s comfort goal  Outcome: Progressing     Problem: Knowledge Deficit - Standard  Goal: Patient and family/care givers will demonstrate understanding of plan of care, disease process/condition, diagnostic tests and medications  Outcome: Progressing     Problem: Provide Safe Environment  Goal: Suicide environmental safety, protocols, policies, and practices will be implemented  Outcome: Progressing     Patient is alert and oriented, on RA.   Fall protocol in effect. Bed in lowest position. Brakes and bed alarm on.   Maintained a clutter free environment. Skid socks on. Call light and personal belongings within reach.   Patient c/o of pain, treated per MAR. Educated on pain scale.   Patient educated on POC. Encouraged verbalize of feelings.   All questions were answered.    Patient is not progressing towards the following goals:

## 2024-08-17 NOTE — PROGRESS NOTES
[facility] Medicine Daily Progress Note    Date of Service  8/17/2024    Chief Complaint  Katelynn Montanez is a 28 y.o. female admitted 8/13/2024 with facial pain    Hospital Course  Patient is a 28 year old female who presented to [facility] on 8/13/2024 with right-sided facial pain.  There was a concern that patient has cavernous sinus thrombosis and neurology was consulted and she was started on heparin infusion.  Later she underwent MRI that did not show cavernous sinus thrombosis and anticoagulation was discontinued and neurology signed off.  Patient was found to have severe acute bacterial sinusitis, acute ethmoid sinusitis, acute sphenoid sinusitis, nasal septal deviation and she underwent bilateral maxillary antrostomy, bilateral sphenoethmoidectomy and bilateral frontal sinusotomy by ENT surgery Dr. Yu.       Interval Problem Update  Axox3, feeling much better today, sinus pain down to 5/10, minimal dizziness, still has some referred pain to both ears and a h/a. No n/v. Exam and vitals stable. ROS otherwise negative. Cutibacterium granulosum light growth, finegoldia light growth, mrsa light growth, awaiting ID input. ROS otherwise negative.     I have discussed this patient's plan of care and discharge plan at IDT rounds today with Case Management, Nursing, Nursing leadership, and other members of the IDT team.    Consultants/Specialty  ENT   ID    Code Status  Full Code    Disposition  The patient is not medically cleared for discharge to home or a post-acute facility.      I have placed the appropriate orders for post-discharge needs.    Review of Systems  Review of Systems   Constitutional:  Positive for malaise/fatigue. Negative for chills, diaphoresis, fever and weight loss.   HENT:  Positive for congestion, ear pain and sinus pain. Negative for sore throat.    Eyes: Negative.  Negative for blurred vision.   Respiratory: Negative.  Negative for cough and shortness of breath.    Cardiovascular:  Negative.  Negative for chest pain, palpitations and leg swelling.   Gastrointestinal: Negative.  Negative for abdominal pain, nausea and vomiting.   Genitourinary: Negative.  Negative for dysuria.   Musculoskeletal:  Positive for back pain (upper back and right shoulder) and myalgias.   Skin: Negative.  Negative for itching and rash.   Neurological:  Positive for dizziness. Negative for focal weakness, weakness and headaches.   Endo/Heme/Allergies: Negative.  Does not bruise/bleed easily.   Psychiatric/Behavioral: Negative.  Negative for depression, substance abuse and suicidal ideas.    All other systems reviewed and are negative.       Physical Exam  Temp:  [36.1 °C (96.9 °F)-37.3 °C (99.1 °F)] 37.3 °C (99.1 °F)  Pulse:  [51-71] 61  Resp:  [16] 16  BP: ()/(52-80) 109/62  SpO2:  [94 %-100 %] 99 %    Physical Exam  Vitals and nursing note reviewed. Exam conducted with a chaperone present.   Constitutional:       General: She is not in acute distress.     Appearance: Normal appearance. She is not diaphoretic.   HENT:      Head: Normocephalic.      Comments: Diffuse sinus pressure/pain to palpation     Right Ear: Tympanic membrane, ear canal and external ear normal.      Left Ear: Tympanic membrane, ear canal and external ear normal.      Nose: Congestion present.      Mouth/Throat:      Mouth: Mucous membranes are moist.   Eyes:      General: No scleral icterus.        Right eye: No discharge.         Left eye: No discharge.      Extraocular Movements: Extraocular movements intact.      Conjunctiva/sclera: Conjunctivae normal.      Pupils: Pupils are equal, round, and reactive to light.   Cardiovascular:      Rate and Rhythm: Normal rate and regular rhythm.      Pulses: Normal pulses.      Heart sounds: Normal heart sounds.   Pulmonary:      Effort: Pulmonary effort is normal.      Breath sounds: Normal breath sounds.   Abdominal:      General: Abdomen is flat. Bowel sounds are normal.      Palpations: Abdomen  is soft.   Musculoskeletal:         General: No swelling or deformity. Normal range of motion.   Skin:     General: Skin is warm and dry.      Capillary Refill: Capillary refill takes less than 2 seconds.   Neurological:      General: No focal deficit present.      Mental Status: She is alert and oriented to person, place, and time.      Cranial Nerves: No cranial nerve deficit.   Psychiatric:         Mood and Affect: Mood normal.         Behavior: Behavior normal.         Fluids    Intake/Output Summary (Last 24 hours) at 8/17/2024 0839  Last data filed at 8/16/2024 0930  Gross per 24 hour   Intake 120 ml   Output --   Net 120 ml       Laboratory  Recent Labs     08/15/24  0445 08/16/24  0441   WBC 10.0 6.3   RBC 4.06* 3.89*   HEMOGLOBIN 11.8* 11.2*   HEMATOCRIT 35.5* 34.0*   MCV 87.4 87.4   MCH 29.1 28.8   MCHC 33.2 32.9   RDW 39.9 40.6   PLATELETCT 334 313   MPV 8.7* 8.7*     Recent Labs     08/15/24  0445 08/16/24  0441   SODIUM 133* 138   POTASSIUM 4.4 4.1   CHLORIDE 105 107   CO2 20 22   GLUCOSE 117* 101*   BUN 4* 8   CREATININE 0.52 0.54   CALCIUM 7.9* 8.3*                     Imaging  MR-ORBITS,FACE,NECK-WITH&W/O & SEQUENCES   Final Result      1.  The cavernous sinuses are unremarkable. There is no evidence of cavernous sinus enlargement or large filling defect to suggest cavernous sinus thrombosis. The superior ophthalmic veins are widely patent. There is no MR evidence of post septal    infection.   2.  There is thickening of the right periorbital fat consistent with preseptal cellulitis.   3.  There is mucosal thickening in the bilateral maxillary, ethmoidal, frontal and sphenoid sinuses consistent with pansinusitis. There is no abnormal dural enhancement to suggest any intracranial extension.      MR-BRAIN-WITH & W/O   Final Result      1.  The cavernous sinuses are unremarkable. There is no evidence of cavernous sinus enlargement or large filling defect to suggest cavernous sinus thrombosis. The  superior ophthalmic veins are widely patent. There is no MR evidence of post septal    infection.   2.  There is thickening of the right periorbital fat consistent with preseptal cellulitis.   3.  There is mucosal thickening in the bilateral maxillary, ethmoidal, frontal and sphenoid sinuses consistent with pansinusitis. There is no abnormal dural enhancement to suggest any intracranial extension.   4.  Unremarkable pre and postcontrast MR examination of the brain.      CT-STEALTH HEAD W/O   Final Result      1. Chronic pansinus disease, sparing the right sphenoid sinus.   2. Right preseptal periorbital soft tissue swelling.   3. The remainder of the CT of the head without IV contrast is within normal limits.                    Assessment/Plan  * Cavernous sinus thrombosis- (present on admission)  Assessment & Plan  Ruled out on MRI  She does have extensive sinusitis causing preseptal cellulitis on the right eye  S/p OR with ENT for washout  See above    Periorbital cellulitis of right eye- (present on admission)  Assessment & Plan  Infectious disease has been following her.  Continue IV Unasyn and p.o. Zyvox.      Acute sinusitis- (present on admission)  Assessment & Plan  Seen on CT. Reportedly improving, diffuse sinus pain  Nares + MRSA  Discussed with ID, continue zyvox and unasyn  isolation    MRI without evidence of cavernous sinus thrombosis  S/p OR with ENT for washout of sinuses  ENT continues to follow  Supportive care           VTE prophylaxis: lovenox    I have performed a physical exam and reviewed and updated ROS and Plan today (8/17/2024). In review of yesterday's note (8/16/2024), there are no changes except as documented above.

## 2024-08-18 ENCOUNTER — PHARMACY VISIT (OUTPATIENT)
Dept: PHARMACY | Facility: MEDICAL CENTER | Age: 29
End: 2024-08-18
Payer: COMMERCIAL

## 2024-08-18 VITALS
OXYGEN SATURATION: 100 % | DIASTOLIC BLOOD PRESSURE: 61 MMHG | HEIGHT: 65 IN | WEIGHT: 142.42 LBS | RESPIRATION RATE: 18 BRPM | TEMPERATURE: 97.3 F | SYSTOLIC BLOOD PRESSURE: 106 MMHG | BODY MASS INDEX: 23.73 KG/M2 | HEART RATE: 60 BPM

## 2024-08-18 PROBLEM — L03.213 PERIORBITAL CELLULITIS OF RIGHT EYE: Status: RESOLVED | Noted: 2024-08-14 | Resolved: 2024-08-18

## 2024-08-18 PROBLEM — G08 CAVERNOUS SINUS THROMBOSIS: Status: RESOLVED | Noted: 2024-08-13 | Resolved: 2024-08-18

## 2024-08-18 LAB
BACTERIA SPEC ANAEROBE CULT: ABNORMAL
SIGNIFICANT IND 70042: ABNORMAL
SITE SITE: ABNORMAL
SOURCE SOURCE: ABNORMAL

## 2024-08-18 PROCEDURE — 99239 HOSP IP/OBS DSCHRG MGMT >30: CPT | Performed by: HOSPITALIST

## 2024-08-18 PROCEDURE — A9270 NON-COVERED ITEM OR SERVICE: HCPCS | Performed by: INTERNAL MEDICINE

## 2024-08-18 PROCEDURE — RXMED WILLOW AMBULATORY MEDICATION CHARGE: Performed by: HOSPITALIST

## 2024-08-18 PROCEDURE — 700102 HCHG RX REV CODE 250 W/ 637 OVERRIDE(OP): Performed by: INTERNAL MEDICINE

## 2024-08-18 PROCEDURE — 700101 HCHG RX REV CODE 250: Performed by: HOSPITALIST

## 2024-08-18 RX ORDER — ECHINACEA PURPUREA EXTRACT 125 MG
2 TABLET ORAL 4 TIMES DAILY
Qty: 44 ML | Refills: 1 | Status: SHIPPED | OUTPATIENT
Start: 2024-08-18

## 2024-08-18 RX ORDER — LIDOCAINE 4 G/G
2 PATCH TOPICAL DAILY
Qty: 3 PATCH | Refills: 1 | Status: SHIPPED | OUTPATIENT
Start: 2024-08-19

## 2024-08-18 RX ORDER — OXYCODONE HYDROCHLORIDE 5 MG/1
2.5-5 TABLET ORAL EVERY 6 HOURS PRN
Qty: 16 TABLET | Refills: 0 | Status: SHIPPED | OUTPATIENT
Start: 2024-08-18 | End: 2024-08-22

## 2024-08-18 RX ORDER — LINEZOLID 600 MG/1
600 TABLET, FILM COATED ORAL EVERY 12 HOURS
Qty: 18 TABLET | Refills: 0 | Status: ACTIVE | OUTPATIENT
Start: 2024-08-18 | End: 2024-08-27

## 2024-08-18 RX ADMIN — SENNOSIDES AND DOCUSATE SODIUM 2 TABLET: 50; 8.6 TABLET ORAL at 06:01

## 2024-08-18 RX ADMIN — TOBRAMYCIN AND DEXAMETHASONE 1 DROP: 1; 3 SUSPENSION/ DROPS OPHTHALMIC at 09:03

## 2024-08-18 RX ADMIN — LIDOCAINE 2 PATCH: 4 PATCH TOPICAL at 06:02

## 2024-08-18 RX ADMIN — AMOXICILLIN AND CLAVULANATE POTASSIUM 1 TABLET: 875; 125 TABLET, FILM COATED ORAL at 09:03

## 2024-08-18 RX ADMIN — OXYCODONE HYDROCHLORIDE 2.5 MG: 5 TABLET ORAL at 09:09

## 2024-08-18 RX ADMIN — LINEZOLID 600 MG: 600 TABLET, FILM COATED ORAL at 06:00

## 2024-08-18 RX ADMIN — TOBRAMYCIN AND DEXAMETHASONE 1 DROP: 1; 3 SUSPENSION/ DROPS OPHTHALMIC at 12:33

## 2024-08-18 RX ADMIN — Medication 2 SPRAY: at 12:33

## 2024-08-18 RX ADMIN — Medication 2 SPRAY: at 09:03

## 2024-08-18 ASSESSMENT — PAIN DESCRIPTION - PAIN TYPE: TYPE: ACUTE PAIN

## 2024-08-18 NOTE — PROGRESS NOTES
Pt alert/oriented, [place] citizen speaking, medicated for head/eye pain. Family at bedside. Isolation precautions in place. Call light within reach, personal belongings available, bed in lowest position, treaded socks on, and hourly rounding in place.

## 2024-08-18 NOTE — DISCHARGE SUMMARY
Discharge Summary    CHIEF COMPLAINT ON ADMISSION  Chief Complaint   Patient presents with    Other    Flu Like Symptoms       Reason for Admission  ems     Admission Date  8/13/2024    CODE STATUS  Full Code    HPI & HOSPITAL COURSE  Patient is a 28 year old female who presented to [facility] on 8/13/2024 with right-sided facial pain. There was a concern that patient has cavernous sinus thrombosis and neurology was consulted and she was started on heparin infusion. Later she underwent MRI that did not show cavernous sinus thrombosis and anticoagulation was discontinued and neurology signed off. Patient was found to have severe acute bacterial sinusitis, acute ethmoid sinusitis, acute sphenoid sinusitis, nasal septal deviation and she underwent bilateral maxillary antrostomy, bilateral sphenoethmoidectomy and bilateral frontal sinusotomy by ENT surgery Dr. Yu.   Her wound cultures grew cutibacterium granulosum , finegoldia and mrsa. Per ID recommendations she will be discharged on oral linezolid and augmentin. Her cellulitis has resolved at time of discharge and her sinus pain has nearly resolved. She is tolerating a regular diet, doing very well on room air and she was cleared by ID and ENT surgery. She agrees to follow up with Dr Yu and her pcp and to return to the ER if needed.      Therefore, she is discharged in fair and stable condition to home with close outpatient follow-up.    The patient met 2-midnight criteria for an inpatient stay at the time of discharge.    Discharge Date  8/18/24    FOLLOW UP ITEMS POST DISCHARGE  [facility]  Pcp    DISCHARGE DIAGNOSES  Principal Problem (Resolved):    Cavernous sinus thrombosis (POA: Yes)  Active Problems:    Acute sinusitis (POA: Yes)  Resolved Problems:    Periorbital cellulitis of right eye (POA: Yes)      FOLLOW UP  No future appointments.  Angelita Yu M.D.  [address] [place] [id number]  [phone]    Follow up in 1 week(s)        MEDICATIONS ON  DISCHARGE     Medication List        START taking these medications        Instructions   amoxicillin-clavulanate 875-125 MG Tabs  Commonly known as: Augmentin   Take 1 Tablet by mouth every 12 hours for 9 days.  Dose: 1 Tablet     lidocaine 4 % Ptch  Start taking on: August 19, 2024  Commonly known as: Asperflex   Place 2 Patches on the skin every day.  Dose: 2 Patch     linezolid 600 MG Tabs  Commonly known as: Zyvox   Take 1 Tablet by mouth every 12 hours for 9 days.  Dose: 600 mg     oxyCODONE immediate-release 5 MG Tabs  Commonly known as: Roxicodone   Take 0.5-1 Tablets by mouth every 6 hours as needed for Severe Pain for up to 4 days.  Dose: 2.5-5 mg     sodium chloride 0.65 % Soln  Commonly known as: Ocean   Administer 2 Sprays into affected nostril(S) 4 times a day.  Dose: 2 Spray     tobramycin-dexamethasone 0.3-0.1 % Oint  Commonly known as: Tobradex   Apply 1 Drop to right eye 4 times a day for 2 days.  Dose: 1 Drop              Allergies  No Known Allergies    DIET  Orders Placed This Encounter   Procedures    Diet Order Diet: Clear Liquid     Standing Status:   Standing     Number of Occurrences:   1     Order Specific Question:   Diet:     Answer:   Clear Liquid [10]       ACTIVITY  As tolerated.  Weight bearing as tolerated    CONSULTATIONS  [facility]    PROCEDURES  8/14/24 PROCEDURES:  1.  Bilateral maxillary antrostomy.  2.  Bilateral sphenoethmoidectomy.  3. Bilateral frontal sinusotomy  4. Septoplasty   5. Bilateral Inferior turbinate submucous resection with outfracture  6.  Utilization of image guidance, extradural.     SURGEON:  Angelita Yu MD    MR-ORBITS,FACE,NECK-WITH&W/O & SEQUENCES   Final Result      1.  The cavernous sinuses are unremarkable. There is no evidence of cavernous sinus enlargement or large filling defect to suggest cavernous sinus thrombosis. The superior ophthalmic veins are widely patent. There is no MR evidence of post septal    infection.   2.  There is thickening of  the right periorbital fat consistent with preseptal cellulitis.   3.  There is mucosal thickening in the bilateral maxillary, ethmoidal, frontal and sphenoid sinuses consistent with pansinusitis. There is no abnormal dural enhancement to suggest any intracranial extension.      MR-BRAIN-WITH & W/O   Final Result      1.  The cavernous sinuses are unremarkable. There is no evidence of cavernous sinus enlargement or large filling defect to suggest cavernous sinus thrombosis. The superior ophthalmic veins are widely patent. There is no MR evidence of post septal    infection.   2.  There is thickening of the right periorbital fat consistent with preseptal cellulitis.   3.  There is mucosal thickening in the bilateral maxillary, ethmoidal, frontal and sphenoid sinuses consistent with pansinusitis. There is no abnormal dural enhancement to suggest any intracranial extension.   4.  Unremarkable pre and postcontrast MR examination of the brain.      CT-STEALTH HEAD W/O   Final Result      1. Chronic pansinus disease, sparing the right sphenoid sinus.   2. Right preseptal periorbital soft tissue swelling.   3. The remainder of the CT of the head without IV contrast is within normal limits.                     LABORATORY  Lab Results   Component Value Date    SODIUM 138 08/16/2024    POTASSIUM 4.1 08/16/2024    CHLORIDE 107 08/16/2024    CO2 22 08/16/2024    GLUCOSE 101 (H) 08/16/2024    BUN 8 08/16/2024    CREATININE 0.54 08/16/2024        Lab Results   Component Value Date    WBC 6.3 08/16/2024    HEMOGLOBIN 11.2 (L) 08/16/2024    HEMATOCRIT 34.0 (L) 08/16/2024    PLATELETCT 313 08/16/2024        Total time of the discharge process exceeds 45 minutes.

## 2024-08-18 NOTE — CARE PLAN
The patient is Stable - Low risk of patient condition declining or worsening    Shift Goals  Clinical Goals: pain management throughout shift  Patient Goals: rest  Family Goals: n/a    Progress made toward(s) clinical / shift goals:    Problem: Knowledge Deficit - Standard  Goal: Patient and family/care givers will demonstrate understanding of plan of care, disease process/condition, diagnostic tests and medications  Outcome: Progressing     Problem: Psychosocial  Goal: Patient's ability to identify and develop effective coping behaviors will improve  Outcome: Progressing       Patient is not progressing towards the following goals:      Problem: Pain - Standard  Goal: Alleviation of pain or a reduction in pain to the patient’s comfort goal  Outcome: Not Progressing

## 2024-08-19 ENCOUNTER — TELEPHONE (OUTPATIENT)
Dept: HEALTH INFORMATION MANAGEMENT | Facility: OTHER | Age: 29
End: 2024-08-19

## 2024-08-19 LAB
BACTERIA BLD CULT: NORMAL
BACTERIA BLD CULT: NORMAL
SIGNIFICANT IND 70042: NORMAL
SIGNIFICANT IND 70042: NORMAL
SITE SITE: NORMAL
SITE SITE: NORMAL
SOURCE SOURCE: NORMAL
SOURCE SOURCE: NORMAL

## 2024-08-19 ASSESSMENT — PAIN SCALES - GENERAL: PAIN_LEVEL: 2

## 2024-08-19 NOTE — ANESTHESIA POSTPROCEDURE EVALUATION
Patient: Katelynn Montanez    Procedure Summary       Date: 08/14/24 Room / Location: [place] [id number] / SURGERY [facility]    Anesthesia Start: 1634 Anesthesia Stop: 1903    Procedure: BILATERAL SINUS SURGERY, TURBINATE REDUCTION WITH STEALTH GUIDANCE, SEPTOPLASTY (Bilateral: Nose) Diagnosis: (sinusitis, periorbital cellulitis)    Surgeons: Angelita Yu M.D. Responsible Provider: Shivam Schneider D.O.    Anesthesia Type: general ASA Status: 2            Final Anesthesia Type: general  Last vitals  BP   Blood Pressure: 106/61    Temp   36.3 °C (97.3 °F)    Pulse   60   Resp   18    SpO2   100 %      Anesthesia Post Evaluation    Patient location during evaluation: PACU  Patient participation: complete - patient participated  Level of consciousness: awake and alert  Pain score: 2    Airway patency: patent  Anesthetic complications: no  Cardiovascular status: hemodynamically stable  Respiratory status: acceptable  Hydration status: euvolemic    PONV: none          No notable events documented.     Nurse Pain Score: 4 (NPRS)

## 2024-08-20 LAB
FUNGUS SPEC CULT: NORMAL
FUNGUS SPEC CULT: NORMAL
FUNGUS SPEC FUNGUS STN: NORMAL
FUNGUS SPEC FUNGUS STN: NORMAL
SIGNIFICANT IND 70042: NORMAL
SIGNIFICANT IND 70042: NORMAL
SITE SITE: NORMAL
SITE SITE: NORMAL
SOURCE SOURCE: NORMAL
SOURCE SOURCE: NORMAL

## 2024-09-09 LAB
FUNGUS SPEC CULT: NORMAL
FUNGUS SPEC CULT: NORMAL
FUNGUS SPEC FUNGUS STN: NORMAL
FUNGUS SPEC FUNGUS STN: NORMAL
SIGNIFICANT IND 70042: NORMAL
SIGNIFICANT IND 70042: NORMAL
SITE SITE: NORMAL
SITE SITE: NORMAL
SOURCE SOURCE: NORMAL
SOURCE SOURCE: NORMAL

## (undated) DEVICE — SENSOR OXIMETER ADULT SPO2 RD SET (20EA/BX)

## (undated) DEVICE — SYRINGE 50 ML LL (40EA/BX 4BX/CA)

## (undated) DEVICE — CANNULA O2 COMFORT SOFT EAR ADULT 7 FT TUBING (50/CA)

## (undated) DEVICE — TRACKER ENT PATIENT

## (undated) DEVICE — SET EXTENSION WITH 2 PORTS (48EA/CA) ***PART #2C8610 IS A SUBSTITUTE*****

## (undated) DEVICE — MASK OXYGEN VNYL ADLT MED CONC WITH 7 FOOT TUBING - (50EA/CA)

## (undated) DEVICE — SHEATH SHARP SITE 30 DEGREE ENDOSCRUB II (5EA/PK)

## (undated) DEVICE — GOWN SURGEONS LARGE - (32/CA)

## (undated) DEVICE — SODIUM CHL. INJ. 0.9% 500ML (24EA/CA 50CA/PF)

## (undated) DEVICE — ANTI-FOG SOLUTION - 60BTL/CA

## (undated) DEVICE — SLEEVE VASO DVT COMPRESSION CALF MED - (10PR/CA)

## (undated) DEVICE — SWAB ANAEROBIC SPEC.COLLECTOR - (25/PK 4PK/CA 100EA/CA)

## (undated) DEVICE — PATTIES SURG X-RAYCOTTONOID - 1/2 X 3 IN (200/CA)

## (undated) DEVICE — TUBE SHILEY ENDOTRACHEAL ORAL RAE CUFFED 7.0MM WITH TAPERGUARD (10EA/PK)

## (undated) DEVICE — SUTURE 5-0 PLAIN GUT PC-1 - (12/BX)

## (undated) DEVICE — GLOVE SZ 6.5 BIOGEL PI MICRO - PF LF (50PR/BX)

## (undated) DEVICE — Device

## (undated) DEVICE — LACTATED RINGERS INJ 1000 ML - (14EA/CA 60CA/PF)

## (undated) DEVICE — TUBING ENDOSCRUB II (5EA/PK)

## (undated) DEVICE — TOWELS CLOTH SURGICAL - (4/PK 20PK/CA)

## (undated) DEVICE — CANISTER SUCTION 3000ML MECHANICAL FILTER AUTO SHUTOFF MEDI-VAC NONSTERILE LF DISP (40EA/CA)

## (undated) DEVICE — SOD. CHL. INJ. 0.9% 250 ML - (36/CA 50CA/PF)

## (undated) DEVICE — BLADE STRAIGHT SINUS (5EA/PK)

## (undated) DEVICE — TOWEL STOP TIMEOUT SAFETY FLAG (40EA/CA)

## (undated) DEVICE — BOVIE FOOT CONTROL SUCTION - 6IN 10FR (25EA/CA)

## (undated) DEVICE — TUBING CLEARLINK DUO-VENT - C-FLO (48EA/CA)

## (undated) DEVICE — KIT  I.V. START (100EA/CA)

## (undated) DEVICE — GOWN WARMING STANDARD FLEX - (30/CA)

## (undated) DEVICE — SET LEADWIRE 5 LEAD BEDSIDE DISPOSABLE ECG (1SET OF 5/EA)

## (undated) DEVICE — COVER LIGHT HANDLE ALC PLUS DISP (18EA/BX)

## (undated) DEVICE — SUTURE 4-0 PLAIN GUT SC-1 ETHICON (36PK/BX)